# Patient Record
Sex: FEMALE | Race: WHITE | Employment: UNEMPLOYED | URBAN - METROPOLITAN AREA
[De-identification: names, ages, dates, MRNs, and addresses within clinical notes are randomized per-mention and may not be internally consistent; named-entity substitution may affect disease eponyms.]

---

## 2018-05-17 ENCOUNTER — HOSPITAL ENCOUNTER (OUTPATIENT)
Dept: MAMMOGRAPHY | Age: 78
Discharge: HOME | End: 2018-05-17
Attending: INTERNAL MEDICINE
Payer: MEDICARE

## 2018-05-17 DIAGNOSIS — Z78.0 POST-MENOPAUSAL: ICD-10-CM

## 2018-05-17 DIAGNOSIS — Z12.31 VISIT FOR SCREENING MAMMOGRAM: ICD-10-CM

## 2018-05-17 PROCEDURE — 77067 SCR MAMMO BI INCL CAD: CPT

## 2018-05-17 PROCEDURE — 77080 DXA BONE DENSITY AXIAL: CPT

## 2018-08-24 ENCOUNTER — APPOINTMENT (OUTPATIENT)
Dept: CT IMAGING | Age: 78
DRG: 305 | End: 2018-08-24
Attending: EMERGENCY MEDICINE
Payer: MEDICARE

## 2018-08-24 ENCOUNTER — HOSPITAL ENCOUNTER (INPATIENT)
Age: 78
LOS: 1 days | Discharge: HOME HEALTH | DRG: 305 | End: 2018-08-27
Attending: EMERGENCY MEDICINE | Admitting: INTERNAL MEDICINE
Payer: MEDICARE

## 2018-08-24 DIAGNOSIS — G45.3 AMAUROSIS FUGAX OF LEFT EYE: Primary | ICD-10-CM

## 2018-08-24 DIAGNOSIS — R29.6 FALLING: ICD-10-CM

## 2018-08-24 DIAGNOSIS — I10 HYPERTENSION, UNSPECIFIED TYPE: ICD-10-CM

## 2018-08-24 DIAGNOSIS — S00.83XA CONTUSION OF FACE, INITIAL ENCOUNTER: ICD-10-CM

## 2018-08-24 LAB
ALBUMIN SERPL-MCNC: 3.9 G/DL (ref 3.2–4.6)
ALBUMIN/GLOB SERPL: 0.8 {RATIO} (ref 1.2–3.5)
ALP SERPL-CCNC: 127 U/L (ref 50–136)
ALT SERPL-CCNC: 21 U/L (ref 12–65)
ANION GAP SERPL CALC-SCNC: 12 MMOL/L (ref 7–16)
AST SERPL-CCNC: 15 U/L (ref 15–37)
BASOPHILS # BLD: 0.1 K/UL (ref 0–0.2)
BASOPHILS NFR BLD: 1 % (ref 0–2)
BILIRUB DIRECT SERPL-MCNC: <0.1 MG/DL
BILIRUB SERPL-MCNC: 0.3 MG/DL (ref 0.2–1.1)
BUN SERPL-MCNC: 17 MG/DL (ref 8–23)
CALCIUM SERPL-MCNC: 10.6 MG/DL (ref 8.3–10.4)
CHLORIDE SERPL-SCNC: 104 MMOL/L (ref 98–107)
CO2 SERPL-SCNC: 28 MMOL/L (ref 21–32)
CREAT SERPL-MCNC: 0.74 MG/DL (ref 0.6–1)
DIFFERENTIAL METHOD BLD: NORMAL
EOSINOPHIL # BLD: 0.1 K/UL (ref 0–0.8)
EOSINOPHIL NFR BLD: 2 % (ref 0.5–7.8)
ERYTHROCYTE [DISTWIDTH] IN BLOOD BY AUTOMATED COUNT: 12.8 %
GLOBULIN SER CALC-MCNC: 4.7 G/DL (ref 2.3–3.5)
GLUCOSE BLD STRIP.AUTO-MCNC: 182 MG/DL (ref 65–100)
GLUCOSE SERPL-MCNC: 136 MG/DL (ref 65–100)
HCT VFR BLD AUTO: 38.1 % (ref 35.8–46.3)
HGB BLD-MCNC: 12.5 G/DL (ref 11.7–15.4)
IMM GRANULOCYTES # BLD: 0 K/UL (ref 0–0.5)
IMM GRANULOCYTES NFR BLD AUTO: 0 % (ref 0–5)
LIPASE SERPL-CCNC: 142 U/L (ref 73–393)
LYMPHOCYTES # BLD: 2.1 K/UL (ref 0.5–4.6)
LYMPHOCYTES NFR BLD: 31 % (ref 13–44)
MCH RBC QN AUTO: 28.3 PG (ref 26.1–32.9)
MCHC RBC AUTO-ENTMCNC: 32.8 G/DL (ref 31.4–35)
MCV RBC AUTO: 86.4 FL (ref 79.6–97.8)
MONOCYTES # BLD: 0.5 K/UL (ref 0.1–1.3)
MONOCYTES NFR BLD: 8 % (ref 4–12)
NEUTS SEG # BLD: 3.9 K/UL (ref 1.7–8.2)
NEUTS SEG NFR BLD: 59 % (ref 43–78)
NRBC # BLD: 0 K/UL (ref 0–0.2)
PLATELET # BLD AUTO: 190 K/UL (ref 150–450)
PMV BLD AUTO: 11 FL (ref 9.4–12.3)
POTASSIUM SERPL-SCNC: 3.5 MMOL/L (ref 3.5–5.1)
PROT SERPL-MCNC: 8.6 G/DL (ref 6.3–8.2)
RBC # BLD AUTO: 4.41 M/UL (ref 4.05–5.2)
SODIUM SERPL-SCNC: 144 MMOL/L (ref 136–145)
WBC # BLD AUTO: 6.7 K/UL (ref 4.3–11.1)

## 2018-08-24 PROCEDURE — 80048 BASIC METABOLIC PNL TOTAL CA: CPT

## 2018-08-24 PROCEDURE — 99218 HC RM OBSERVATION: CPT

## 2018-08-24 PROCEDURE — 96374 THER/PROPH/DIAG INJ IV PUSH: CPT | Performed by: EMERGENCY MEDICINE

## 2018-08-24 PROCEDURE — 72125 CT NECK SPINE W/O DYE: CPT

## 2018-08-24 PROCEDURE — 99284 EMERGENCY DEPT VISIT MOD MDM: CPT | Performed by: EMERGENCY MEDICINE

## 2018-08-24 PROCEDURE — 85025 COMPLETE CBC W/AUTO DIFF WBC: CPT

## 2018-08-24 PROCEDURE — 82962 GLUCOSE BLOOD TEST: CPT

## 2018-08-24 PROCEDURE — 83690 ASSAY OF LIPASE: CPT

## 2018-08-24 PROCEDURE — 96375 TX/PRO/DX INJ NEW DRUG ADDON: CPT | Performed by: EMERGENCY MEDICINE

## 2018-08-24 PROCEDURE — 74011250637 HC RX REV CODE- 250/637: Performed by: FAMILY MEDICINE

## 2018-08-24 PROCEDURE — 74011000250 HC RX REV CODE- 250: Performed by: EMERGENCY MEDICINE

## 2018-08-24 PROCEDURE — 70450 CT HEAD/BRAIN W/O DYE: CPT

## 2018-08-24 PROCEDURE — 74011636637 HC RX REV CODE- 636/637: Performed by: FAMILY MEDICINE

## 2018-08-24 PROCEDURE — 77030020263 HC SOL INJ SOD CL0.9% LFCR 1000ML

## 2018-08-24 PROCEDURE — 93005 ELECTROCARDIOGRAM TRACING: CPT | Performed by: EMERGENCY MEDICINE

## 2018-08-24 PROCEDURE — 74011250636 HC RX REV CODE- 250/636: Performed by: FAMILY MEDICINE

## 2018-08-24 PROCEDURE — 80076 HEPATIC FUNCTION PANEL: CPT

## 2018-08-24 RX ORDER — MONTELUKAST SODIUM 10 MG/1
10 TABLET ORAL DAILY
Status: DISCONTINUED | OUTPATIENT
Start: 2018-08-25 | End: 2018-08-27 | Stop reason: HOSPADM

## 2018-08-24 RX ORDER — SODIUM CHLORIDE 0.9 % (FLUSH) 0.9 %
5-10 SYRINGE (ML) INJECTION EVERY 8 HOURS
Status: DISCONTINUED | OUTPATIENT
Start: 2018-08-24 | End: 2018-08-27 | Stop reason: HOSPADM

## 2018-08-24 RX ORDER — DEXTROSE 40 %
15 GEL (GRAM) ORAL AS NEEDED
Status: DISCONTINUED | OUTPATIENT
Start: 2018-08-24 | End: 2018-08-27 | Stop reason: HOSPADM

## 2018-08-24 RX ORDER — BISACODYL 5 MG
5 TABLET, DELAYED RELEASE (ENTERIC COATED) ORAL DAILY PRN
Status: DISCONTINUED | OUTPATIENT
Start: 2018-08-24 | End: 2018-08-27 | Stop reason: HOSPADM

## 2018-08-24 RX ORDER — MIRTAZAPINE 15 MG/1
15 TABLET, FILM COATED ORAL
Status: DISCONTINUED | OUTPATIENT
Start: 2018-08-24 | End: 2018-08-26

## 2018-08-24 RX ORDER — ENOXAPARIN SODIUM 100 MG/ML
40 INJECTION SUBCUTANEOUS EVERY 24 HOURS
Status: DISCONTINUED | OUTPATIENT
Start: 2018-08-24 | End: 2018-08-27 | Stop reason: HOSPADM

## 2018-08-24 RX ORDER — BENAZEPRIL HYDROCHLORIDE 10 MG/1
40 TABLET ORAL DAILY
Status: DISCONTINUED | OUTPATIENT
Start: 2018-08-25 | End: 2018-08-27 | Stop reason: HOSPADM

## 2018-08-24 RX ORDER — ATENOLOL 25 MG/1
25 TABLET ORAL DAILY
Status: DISCONTINUED | OUTPATIENT
Start: 2018-08-25 | End: 2018-08-25

## 2018-08-24 RX ORDER — DEXTROSE 50 % IN WATER (D50W) INTRAVENOUS SYRINGE
25-50 AS NEEDED
Status: DISCONTINUED | OUTPATIENT
Start: 2018-08-24 | End: 2018-08-27 | Stop reason: HOSPADM

## 2018-08-24 RX ORDER — CLONIDINE HYDROCHLORIDE 0.1 MG/1
0.1 TABLET ORAL
Status: DISCONTINUED | OUTPATIENT
Start: 2018-08-24 | End: 2018-08-25

## 2018-08-24 RX ORDER — CHLORTHALIDONE 25 MG/1
25 TABLET ORAL DAILY
Status: DISCONTINUED | OUTPATIENT
Start: 2018-08-25 | End: 2018-08-25

## 2018-08-24 RX ORDER — FAMOTIDINE 20 MG/1
20 TABLET, FILM COATED ORAL EVERY 12 HOURS
Status: DISCONTINUED | OUTPATIENT
Start: 2018-08-24 | End: 2018-08-27 | Stop reason: HOSPADM

## 2018-08-24 RX ORDER — SODIUM CHLORIDE 9 MG/ML
100 INJECTION, SOLUTION INTRAVENOUS CONTINUOUS
Status: DISCONTINUED | OUTPATIENT
Start: 2018-08-24 | End: 2018-08-25

## 2018-08-24 RX ORDER — LABETALOL HYDROCHLORIDE 5 MG/ML
20 INJECTION, SOLUTION INTRAVENOUS
Status: COMPLETED | OUTPATIENT
Start: 2018-08-24 | End: 2018-08-24

## 2018-08-24 RX ORDER — FERROUS SULFATE, DRIED 160(50) MG
1 TABLET, EXTENDED RELEASE ORAL
Status: DISCONTINUED | OUTPATIENT
Start: 2018-08-25 | End: 2018-08-27 | Stop reason: HOSPADM

## 2018-08-24 RX ORDER — LORATADINE 10 MG/1
10 TABLET ORAL DAILY
Status: DISCONTINUED | OUTPATIENT
Start: 2018-08-25 | End: 2018-08-27 | Stop reason: HOSPADM

## 2018-08-24 RX ORDER — HYDRALAZINE HYDROCHLORIDE 50 MG/1
50 TABLET, FILM COATED ORAL
Status: DISCONTINUED | OUTPATIENT
Start: 2018-08-24 | End: 2018-08-25

## 2018-08-24 RX ORDER — ACETAMINOPHEN 325 MG/1
650 TABLET ORAL
Status: DISCONTINUED | OUTPATIENT
Start: 2018-08-24 | End: 2018-08-27 | Stop reason: HOSPADM

## 2018-08-24 RX ORDER — PRAVASTATIN SODIUM 20 MG/1
40 TABLET ORAL
Status: DISCONTINUED | OUTPATIENT
Start: 2018-08-24 | End: 2018-08-27 | Stop reason: HOSPADM

## 2018-08-24 RX ORDER — ONDANSETRON 2 MG/ML
4 INJECTION INTRAMUSCULAR; INTRAVENOUS
Status: DISCONTINUED | OUTPATIENT
Start: 2018-08-24 | End: 2018-08-27 | Stop reason: HOSPADM

## 2018-08-24 RX ORDER — SPIRONOLACTONE 25 MG/1
25 TABLET ORAL DAILY
Status: DISCONTINUED | OUTPATIENT
Start: 2018-08-25 | End: 2018-08-25

## 2018-08-24 RX ORDER — ASPIRIN 81 MG/1
81 TABLET ORAL DAILY
Status: DISCONTINUED | OUTPATIENT
Start: 2018-08-25 | End: 2018-08-27 | Stop reason: HOSPADM

## 2018-08-24 RX ORDER — LORATADINE 10 MG/1
10 TABLET ORAL DAILY
Status: DISCONTINUED | OUTPATIENT
Start: 2018-08-25 | End: 2018-08-25 | Stop reason: SDUPTHER

## 2018-08-24 RX ORDER — LORAZEPAM 1 MG/1
1 TABLET ORAL 2 TIMES DAILY
Status: DISCONTINUED | OUTPATIENT
Start: 2018-08-24 | End: 2018-08-25

## 2018-08-24 RX ORDER — SENNOSIDES 8.6 MG/1
1 TABLET ORAL DAILY
Status: DISCONTINUED | OUTPATIENT
Start: 2018-08-25 | End: 2018-08-27 | Stop reason: HOSPADM

## 2018-08-24 RX ORDER — CALCITONIN SALMON 200 [IU]/.09ML
1 SPRAY, METERED NASAL DAILY
Status: DISCONTINUED | OUTPATIENT
Start: 2018-08-25 | End: 2018-08-24

## 2018-08-24 RX ORDER — PANTOPRAZOLE SODIUM 40 MG/1
40 TABLET, DELAYED RELEASE ORAL
Status: DISCONTINUED | OUTPATIENT
Start: 2018-08-25 | End: 2018-08-27 | Stop reason: HOSPADM

## 2018-08-24 RX ORDER — HYDRALAZINE HYDROCHLORIDE 20 MG/ML
20 INJECTION INTRAMUSCULAR; INTRAVENOUS ONCE
Status: COMPLETED | OUTPATIENT
Start: 2018-08-24 | End: 2018-08-24

## 2018-08-24 RX ORDER — SODIUM CHLORIDE 0.9 % (FLUSH) 0.9 %
5-10 SYRINGE (ML) INJECTION AS NEEDED
Status: DISCONTINUED | OUTPATIENT
Start: 2018-08-24 | End: 2018-08-27 | Stop reason: HOSPADM

## 2018-08-24 RX ADMIN — FAMOTIDINE 20 MG: 20 TABLET ORAL at 22:06

## 2018-08-24 RX ADMIN — PRAVASTATIN SODIUM 40 MG: 20 TABLET ORAL at 22:06

## 2018-08-24 RX ADMIN — LORAZEPAM 1 MG: 1 TABLET ORAL at 22:06

## 2018-08-24 RX ADMIN — INSULIN HUMAN 2 UNITS: 100 INJECTION, SOLUTION PARENTERAL at 22:55

## 2018-08-24 RX ADMIN — SODIUM CHLORIDE 100 ML/HR: 900 INJECTION, SOLUTION INTRAVENOUS at 22:06

## 2018-08-24 RX ADMIN — LABETALOL HYDROCHLORIDE 20 MG: 5 INJECTION, SOLUTION INTRAVENOUS at 19:25

## 2018-08-24 RX ADMIN — HYDRALAZINE HYDROCHLORIDE 20 MG: 20 INJECTION INTRAMUSCULAR; INTRAVENOUS at 19:40

## 2018-08-24 RX ADMIN — ENOXAPARIN SODIUM 40 MG: 40 INJECTION, SOLUTION INTRAVENOUS; SUBCUTANEOUS at 22:06

## 2018-08-24 RX ADMIN — MIRTAZAPINE 15 MG: 15 TABLET, FILM COATED ORAL at 22:06

## 2018-08-24 NOTE — ED PROVIDER NOTES
HPI Comments: 59-year-old white female who lives at home has had increased falling for the past couple weeks. She had a fall 1 week ago and struck her head on a nightstand. No loss of consciousness. She was not seen at the time of the fall. She did have nausea and vomiting the day of the fall and the next day but that has resolved. She does report that she feels \"confused\". She states she has trouble remembering names. She is also complaining of headache intermittently. She also describes a pressure discomfort in the back of her head and upper part of her neck. Patient also states that over the past 2 or 3 weeks and she has lost vision in her left eye. She states this comes on abruptly and lasts a few minutes. She does not have any eye pain with these episodes. Patient is a 77 y.o. female presenting with fall. The history is provided by the patient. Fall   Associated symptoms include headaches. Pertinent negatives include no fever, no abdominal pain and no vomiting. Past Medical History:   Diagnosis Date    Arthritis     Atrial fibrillation (HCC)     Diabetes ([facility] [address]     GERD (gastroesophageal reflux disease)     HTN     Hyperlipidemia     Paroxysmal SVT [facility]ra[facility]ricular tachycardia) ([facility]) / Rapid atrial 10/12/2015    Seasonal allergic rhinitis        Past Surgical History:   Procedure Laterality Date    HX CORONARY STENT PLACEMENT      HX HEENT      HX OTHER SURGICAL      AVNRT ABLATION         Family History:   Problem Relation Age of Onset    Heart Attack Mother     Cancer Father      leukemia    Cancer Brother     Cancer Sister     Breast Cancer Sister        Social History     Social History    Marital status:      Spouse name: N/A    Number of children: N/A    Years of education: N/A     Occupational History    Not on file.      Social History Main Topics    Smoking status: Never Smoker    Smokeless tobacco: Never Used    Alcohol use No    Drug use: No    Sexual activity: Not on file     Other Topics Concern    Not on file     Social History Narrative         ALLERGIES: Biaxin [clarithromycin]; Macrobid [nitrofurantoin monohyd/m-cryst]; Macrodantin [nitrofurantoin macrocrystalline]; Norvasc [amlodipine]; and Ofloxacin    Review of Systems   Constitutional: Negative for fever. HENT: Negative for congestion. Respiratory: Negative for cough and shortness of breath. Cardiovascular: Negative for chest pain. Gastrointestinal: Negative for abdominal pain and vomiting. Genitourinary: Negative for dysuria. Musculoskeletal: Positive for neck pain. Negative for back pain. Skin: Negative for rash. Neurological: Positive for headaches. Vitals:    08/24/18 1621   BP: (!) 243/104   Pulse: 77   Resp: 16   Temp: 98.4 °F (36.9 °C)   SpO2: 98%   Weight: 52.2 kg (115 lb)   Height: 5' 5\" (1.651 m)            Physical Exam   Constitutional: She is oriented to person, place, and time. She appears well-developed and well-nourished. No distress. HENT:   Head: Normocephalic. Old contusion left forehead with mild periorbital ecchymosis. Eyes: EOM are normal. Pupils are equal, round, and reactive to light. Neck: Normal range of motion. Neck supple. No midline tenderness   Cardiovascular: Normal rate and regular rhythm. No murmur heard. Pulmonary/Chest: Effort normal. She has no wheezes. Abdominal: Soft. She exhibits no distension and no mass. There is tenderness in the epigastric area. There is no guarding. Musculoskeletal: Normal range of motion. She exhibits no edema. Neurological: She is alert and oriented to person, place, and time. Skin: Skin is warm and dry. Psychiatric: She has a normal mood and affect. Vitals reviewed. MDM  Number of Diagnoses or Management Options  Diagnosis management comments: EKG normal sinus rhythm without diagnostic changes. Lab work unremarkable. Head CT and C-spine CT no acute abnormality.   Patient treated with labetalol for hypertension. Her presentation is concerning for amaurosis fugax. Will discuss with hospitalist for admission.        Amount and/or Complexity of Data Reviewed  Clinical lab tests: ordered and reviewed  Tests in the radiology section of CPT®: ordered and reviewed  Tests in the medicine section of CPT®: ordered and reviewed  Discuss the patient with other providers: yes  Independent visualization of images, tracings, or specimens: yes    Risk of Complications, Morbidity, and/or Mortality  Presenting problems: moderate  Diagnostic procedures: moderate  Management options: moderate          ED Course       Procedures

## 2018-08-24 NOTE — IP AVS SNAPSHOT
[address] [facility] 
 
 
 [address] [facility] [address] [facility] 
[phone] Patient: Cheyenne Nolasco MRN: [id number]:[id number] About your hospitalization You were admitted on:  August 24, 2018 You last received care in the:  [facility] 2 SURGICAL You were discharged on:  August 27, 2018 Why you were hospitalized Your primary diagnosis was:  Htn (Hypertension), Malignant Your diagnoses also included:  Amaurosis Fugax, Diabetes (Hcc), Hypokalemia, Hypomagnesemia, Hyperlipidemia, Anxiety Disorder, Fall At [facility] Follow-up Information Follow up With Details Comments Contact Info Saritha Moreno DO On 9/10/2018 please arrive at 1:50 for an appt time at 2:15 [address] [place] [place] 
[phone] Your Scheduled Appointments Monday September 10, 2018  2:15 PM EDT New Patient with Saritha Moreno  [facility] [address] [place]  
[phone] Discharge Orders None A check archie indicates which time of day the medication should be taken. My Medications START taking these medications Instructions Each Dose to Equal  
 Morning Noon Evening Bedtime  
 hydrALAZINE 50 mg tablet Commonly known as:  APRESOLINE Take 1 Tab by mouth every eight (8) hours. 8 AM, 2 PM, 8 PM  Indications: hypertension 50 mg CHANGE how you take these medications Instructions Each Dose to Equal  
 Morning Noon Evening Bedtime  
 atenolol 25 mg tablet Commonly known as:  TENORMIN What changed:  Another medication with the same name was removed. Continue taking this medication, and follow the directions you see here. Take 1 Tab by mouth two (2) times a day. Indications: hypertension 25 mg  
    
   
   
  
   
  
 benazepril 40 mg tablet Commonly known as:  LOTENSIN  
 What changed:  Another medication with the same name was removed. Continue taking this medication, and follow the directions you see here. Take 1 Tab by mouth daily. Indications: hypertension 40 mg  
    
  
   
   
   
  
 metFORMIN 1,000 mg tablet Commonly known as:  GLUCOPHAGE What changed:  Another medication with the same name was removed. Continue taking this medication, and follow the directions you see here. Your next dose is: Take as directed Take 1,000 mg by mouth two (2) times daily (with meals). Indications: type 2 diabetes mellitus 1000 mg  
    
   
   
   
  
 omeprazole 20 mg capsule Commonly known as:  PriLOSEC What changed:   
- how much to take - when to take this 
- additional instructions Your next dose is: Take as directed Take 2 Caps by mouth daily. 40 mg CONTINUE taking these medications Instructions Each Dose to Equal  
 Morning Noon Evening Bedtime  
 aspirin delayed-release 81 mg tablet Take  by mouth daily. garlic 1 mg Cap Your next dose is: Take as directed Take 1 Cap by mouth daily. 1 Cap  
    
   
   
   
  
 hydroCHLOROthiazide 25 mg tablet Commonly known as:  HYDRODIURIL Take 1 Tab by mouth daily. Indications: hypertension 25 mg  
    
  
   
   
   
  
 multivitamin tablet Commonly known as:  ONE A DAY Your next dose is: Take as directed Take 1 Tab by mouth daily. 1 Tab  
    
   
   
   
  
 pravastatin 40 mg tablet Commonly known as:  PRAVACHOL Take 1 Tab by mouth nightly. 40 mg PROBIOTIC 4X 10-15 mg Tbec Generic drug:  B.infantis-B.ani-B.long-B.bifi Your next dose is: Take as directed Take  by mouth. SALMON OIL-1000 PO Your next dose is: Take as directed Take  by mouth. STOP taking these medications ATIVAN 1 mg tablet Generic drug:  LORazepam  
   
  
 LORazepam 1 mg tablet Commonly known as:  ATIVAN Where to Get Your Medications Information on where to get these meds will be given to you by the nurse or doctor. ! Ask your nurse or doctor about these medications  
  atenolol 25 mg tablet  
 benazepril 40 mg tablet  
 hydrALAZINE 50 mg tablet  
 hydroCHLOROthiazide 25 mg tablet  
 pravastatin 40 mg tablet Discharge Instructions DISCHARGE SUMMARY from Nurse PATIENT INSTRUCTIONS: 
 
 
F-face looks uneven A-arms unable to move or move unevenly S-speech slurred or non-existent T-time-call 911 as soon as signs and symptoms begin-DO NOT go Back to bed or wait to see if you get better-TIME IS BRAIN. Warning Signs of HEART ATTACK Call 911 if you have these symptoms: 
? Chest discomfort. Most heart attacks involve discomfort in the center of the chest that lasts more than a few minutes, or that goes away and comes back. It can feel like uncomfortable pressure, squeezing, fullness, or pain. ? Discomfort in other areas of the upper body. Symptoms can include pain or discomfort in one or both arms, the back, neck, jaw, or stomach. ? Shortness of breath with or without chest discomfort. ? Other signs may include breaking out in a cold sweat, nausea, or lightheadedness. Don't wait more than five minutes to call [address]! Fast action can save your life. Calling 911 is almost always the fastest way to get lifesaving treatment.  Emergency Medical Services staff can begin treatment when they arrive  up to an hour sooner than if someone gets to the hospital by car. The discharge information has been reviewed with the patient. The patient verbalized understanding. Discharge medications reviewed with the patient and appropriate educational materials and side effects teaching were provided. ___________________________________________________________________________________________________________________________________ High Blood Pressure: Care Instructions Your Care Instructions If your blood pressure is usually above 130/80, you have high blood pressure, or hypertension. That means the top number is 130 or higher or the bottom number is 80 or higher, or both. Despite what a lot of people think, high blood pressure usually doesn't cause headaches or make you feel dizzy or lightheaded. It usually has no symptoms. But it does increase your risk for heart attack, stroke, and kidney or eye damage. The higher your blood pressure, the more your risk increases. Your doctor will give you a goal for your blood pressure. Your goal will be based on your health and your age. Lifestyle changes, such as eating healthy and being active, are always important to help lower blood pressure. You might also take medicine to reach your blood pressure goal. 
Follow-up care is a key part of your treatment and safety. Be sure to make and go to all appointments, and call your doctor if you are having problems. It's also a good idea to know your test results and keep a list of the medicines you take. How can you care for yourself at home? Medical treatment · If you stop taking your medicine, your blood pressure will go back up. You may take one or more types of medicine to lower your blood pressure. Be safe with medicines. Take your medicine exactly as prescribed. Call your doctor if you think you are having a problem with your medicine. · Talk to your doctor before you start taking aspirin every day. Aspirin can help certain people lower their risk of a heart attack or stroke. But taking aspirin isn't right for everyone, because it can cause serious bleeding. · See your doctor regularly. You may need to see the doctor more often at first or until your blood pressure comes down. · If you are taking blood pressure medicine, talk to your doctor before you take decongestants or anti-inflammatory medicine, such as ibuprofen. Some of these medicines can raise blood pressure. · Learn how to check your blood pressure at home. Lifestyle changes · Stay at a healthy weight. This is especially important if you put on weight around the waist. Losing even 10 pounds can help you lower your blood pressure. · If your doctor recommends it, get more exercise. Walking is a good choice. Bit by bit, increase the amount you walk every day. Try for at least 30 minutes on most days of the week. You also may want to swim, bike, or do other activities. · Avoid or limit alcohol. Talk to your doctor about whether you can drink any alcohol. · Try to limit how much sodium you eat to less than 2,300 milligrams (mg) a day. Your doctor may ask you to try to eat less than 1,500 mg a day. · Eat plenty of fruits (such as bananas and oranges), vegetables, legumes, whole grains, and low-fat dairy products. · Lower the amount of saturated fat in your diet. Saturated fat is found in animal products such as milk, cheese, and meat. Limiting these foods may help you lose weight and also lower your risk for heart disease. · Do not smoke. Smoking increases your risk for heart attack and stroke. If you need help quitting, talk to your doctor about stop-smoking programs and medicines. These can increase your chances of quitting for good. When should you call for help? Call 911 anytime you think you may need emergency care.  This may mean having symptoms that suggest that your blood pressure is causing a serious heart or blood vessel problem. Your blood pressure may be over 180/110. 
 For example, call 911 if: 
  · You have symptoms of a heart attack. These may include: ¨ Chest pain or pressure, or a strange feeling in the chest. 
¨ Sweating. ¨ Shortness of breath. ¨ Nausea or vomiting. ¨ Pain, pressure, or a strange feeling in the back, neck, jaw, or upper belly or in one or both shoulders or arms. ¨ Lightheadedness or sudden weakness. ¨ A fast or irregular heartbeat.  
  · You have symptoms of a stroke. These may include: 
¨ Sudden numbness, tingling, weakness, or loss of movement in your face, arm, or leg, especially on only one side of your body. ¨ Sudden vision changes. ¨ Sudden trouble speaking. ¨ Sudden confusion or trouble understanding simple statements. ¨ Sudden problems with walking or balance. ¨ A sudden, severe headache that is different from past headaches.  
  · You have severe back or belly pain.  
 Do not wait until your blood pressure comes down on its own. Get help right away. 
 Call your doctor now or seek immediate care if: 
  · Your blood pressure is much higher than normal (such as 180/110 or higher), but you don't have symptoms.  
  · You think high blood pressure is causing symptoms, such as: ¨ Severe headache. ¨ Blurry vision.  
 Watch closely for changes in your health, and be sure to contact your doctor if: 
  · Your blood pressure measures 140/90 or higher at least 2 times. That means the top number is 140 or higher or the bottom number is 90 or higher, or both.  
  · You think you may be having side effects from your blood pressure medicine.  
  · Your blood pressure is usually normal, but it goes above normal at least 2 times. Where can you learn more? Go to http://sophie-luis daniel.info/. Enter B107 in the search box to learn more about \"High Blood Pressure: Care Instructions. \" 
 Current as of: December 6, 2017 Content Version: 11.7 © 1584-8907 [employer]. Care instructions adapted under license by [employer] (which disclaims liability or warranty for this information). If you have questions about a medical condition or this instruction, always ask your healthcare professional. [address] any warranty or liability for your use of this information. [address] Announcement We are excited to announce that we are making your provider's discharge notes available to you in F[address]rier 46. You will see these notes when they are completed and signed by the physician that discharged you from your recent hospital stay. If you have any questions or concerns about any information you see in F[address], please call the Health Information Department where you were seen or reach out to your Primary Care Provider for more information about your plan of care. Introducing [facility]! Farzana Mosqueda introduces F[address] patient portal. Now you can access parts of your medical record, email your doctor's office, and request medication refills online. 1. In your internet browser, go to [email or web address] 2. Click on the First Time User? Click Here link in the Sign In box. You will see the New Member Sign Up page. 3. Enter your F[address] Access Code exactly as it appears below. You will not need to use this code after youve completed the sign-up process. If you do not sign up before the expiration date, you must request a new code. · F[address] Access Code: [email or web address] Expires: 11/22/2018  4:13 PM 
 
4. Enter the last four digits of your Social Security Number (xxxx) and Date of Birth (mm/dd/yyyy) as indicated and click Submit. You will be taken to the next sign-up page. 5. Create a F[address]rier [address] ID. This will be your F[address] login ID and cannot be changed, so think of one that is secure and easy to remember. 6. Create a Kiwigrid password. You can change your password at any time. 7. Enter your Password Reset Question and Answer. This can be used at a later time if you forget your password. 8. Enter your e-mail address. You will receive e-mail notification when new information is available in [address] 9. Click Sign Up. You can now view and download portions of your medical record. 10. Click the Download Summary menu link to download a portable copy of your medical information. If you have questions, please visit the Frequently Asked Questions section of the Kiwigrid website. Remember, Kiwigrid is NOT to be used for urgent needs. For medical emergencies, dial 911. Now available from your iPhone and Android! Introducing Mg Rodriguez As a [facility] patient, I wanted to make you aware of our electronic visit tool called Mg Rodriguez. [employer] 24/7 allows you to connect within minutes with a medical provider 24 hours a day, seven days a week via a mobile device or tablet or logging into a secure website from your computer. You can access Mg Rodriguez from anywhere in the [place]. A virtual visit might be right for you when you have a simple condition and feel like you just dont want to get out of bed, or cant get away from work for an appointment, when your regular [facility] provider is not available (evenings, weekends or holidays), or when youre out of town and need minor care. Electronic visits cost only $49 and if the [facility] [place] 24/7 provider determines a prescription is needed to treat your condition, one can be electronically transmitted to a nearby pharmacy*. Please take a moment to enroll today if you have not already done so. The enrollment process is free and takes just a few minutes. To enroll, please download the [employer] 24/WeeWorld robert to your tablet or phone, or visit [email or web address] to enroll on your computer. And, as an [address] [place] [address] patient with a [employer] account, the results of your visits will be scanned into your electronic medical record and your primary care provider will be able to view the scanned results. We urge you to continue to see your regular [employer] provider for your ongoing medical care. And while your primary care provider may not be the one available when you seek a Mg Emmanuelfin virtual visit, the peace of mind you get from getting a real diagnosis real time can be priceless. For more information on Mg Vapothermjohnfin, view our Frequently Asked Questions (FAQs) at [email or web address]. Sincerely, 
 
Tanesha Andrews MD 
Chief Medical Officer [employer] *:  certain medications cannot be prescribed via Member Deskjohnfin Providers Seen During Your Hospitalization Provider Specialty Primary office phone Brandyn Bauer MD Emergency Medicine [phone] Karen Hidalgo MD Family Practice [phone] Immunizations Administered for This Admission Name Date  
 TB Skin Test (PPD) Intradermal 8/26/2018 Your Primary Care Physician (PCP) Primary Care Physician Office Phone Office Fax Nasim Pimentel [phone] You are allergic to the following Allergen Reactions Biaxin (Clarithromycin) Rash Hives Macrobid (Nitrofurantoin Monohyd/M-Cryst) Rash Macrodantin (Nitrofurantoin Macrocrystalline) Rash Norvasc (Amlodipine) Other (comments) Rash Headache, blurred vision Ofloxacin Other (comments) Rash Blurred vision, headache Recent Documentation Height Weight BMI OB Status Smoking Status 1.651 m 52.2 kg 19.14 kg/m2 Postmenopausal Never Smoker Emergency Contacts Name Discharge Info Relation Home Work Mobile Elisabet Hennessy  Daughter [21] [phone] Patient Belongings The following personal items are in your possession at time of discharge: 
                             
 
  
  
 Please provide this summary of care documentation to your next provider. Signatures-by signing, you are acknowledging that this After Visit Summary has been reviewed with you and you have received a copy. Patient Signature:  ____________________________________________________________ Date:  ____________________________________________________________  
  
[facility]  Provider Signature:  ____________________________________________________________ Date:  ____________________________________________________________

## 2018-08-24 NOTE — ED NOTES
TRANSFER - OUT REPORT:    Verbal report given to Todd(name) on Ken Arellano  being transferred to 205(unit) for routine progression of care       Report consisted of patients Situation, Background, Assessment and   Recommendations(SBAR). Information from the following report(s) ED Summary was reviewed with the receiving nurse. Lines:   Peripheral IV 08/24/18 Right Antecubital (Active)   Site Assessment Clean, dry, & intact 8/24/2018  7:54 PM   Phlebitis Assessment 0 8/24/2018  7:54 PM   Infiltration Assessment 0 8/24/2018  7:54 PM   Dressing Status Clean, dry, & intact 8/24/2018  7:54 PM        Opportunity for questions and clarification was provided.       Patient transported with:   [employer]

## 2018-08-24 NOTE — ED TRIAGE NOTES
Pt states she fell and hit her head last Saturday. Pt takes a daily asa. Pt states she got her feet tangled and fell. Denies any loc. Pt states having vomiting when it happened but none since. Pt states having a headache and neck pain. Family states the pt has been complaining of left sided abdominal pain.

## 2018-08-25 ENCOUNTER — APPOINTMENT (OUTPATIENT)
Dept: MRI IMAGING | Age: 78
DRG: 305 | End: 2018-08-25
Attending: FAMILY MEDICINE
Payer: MEDICARE

## 2018-08-25 PROBLEM — E87.6 HYPOKALEMIA: Status: ACTIVE | Noted: 2018-08-25

## 2018-08-25 PROBLEM — E83.42 HYPOMAGNESEMIA: Status: ACTIVE | Noted: 2018-08-25

## 2018-08-25 LAB
ANION GAP SERPL CALC-SCNC: 8 MMOL/L (ref 7–16)
ATRIAL RATE: 69 BPM
BUN SERPL-MCNC: 14 MG/DL (ref 8–23)
CALCIUM SERPL-MCNC: 9.6 MG/DL (ref 8.3–10.4)
CALCULATED P AXIS, ECG09: 55 DEGREES
CALCULATED R AXIS, ECG10: -2 DEGREES
CALCULATED T AXIS, ECG11: 25 DEGREES
CHLORIDE SERPL-SCNC: 106 MMOL/L (ref 98–107)
CHOLEST SERPL-MCNC: 112 MG/DL
CO2 SERPL-SCNC: 28 MMOL/L (ref 21–32)
CREAT SERPL-MCNC: 0.58 MG/DL (ref 0.6–1)
CRP SERPL-MCNC: <0.3 MG/DL (ref 0–0.9)
DIAGNOSIS, 93000: NORMAL
ERYTHROCYTE [DISTWIDTH] IN BLOOD BY AUTOMATED COUNT: 12.7 %
ERYTHROCYTE [SEDIMENTATION RATE] IN BLOOD: 22 MM/HR (ref 0–30)
EST. AVERAGE GLUCOSE BLD GHB EST-MCNC: 134 MG/DL
GLUCOSE BLD STRIP.AUTO-MCNC: 120 MG/DL (ref 65–100)
GLUCOSE BLD STRIP.AUTO-MCNC: 120 MG/DL (ref 65–100)
GLUCOSE BLD STRIP.AUTO-MCNC: 127 MG/DL (ref 65–100)
GLUCOSE BLD STRIP.AUTO-MCNC: 145 MG/DL (ref 65–100)
GLUCOSE SERPL-MCNC: 106 MG/DL (ref 65–100)
HBA1C MFR BLD: 6.3 % (ref 4.8–6)
HCT VFR BLD AUTO: 32.5 % (ref 35.8–46.3)
HDLC SERPL-MCNC: 43 MG/DL (ref 40–60)
HDLC SERPL: 2.6 {RATIO}
HGB BLD-MCNC: 10.7 G/DL (ref 11.7–15.4)
LDLC SERPL CALC-MCNC: 41.4 MG/DL
LIPID PROFILE,FLP: ABNORMAL
MAGNESIUM SERPL-MCNC: 1.5 MG/DL (ref 1.8–2.4)
MCH RBC QN AUTO: 28.3 PG (ref 26.1–32.9)
MCHC RBC AUTO-ENTMCNC: 32.9 G/DL (ref 31.4–35)
MCV RBC AUTO: 86 FL (ref 79.6–97.8)
NRBC # BLD: 0 K/UL (ref 0–0.2)
P-R INTERVAL, ECG05: 166 MS
PLATELET # BLD AUTO: 172 K/UL (ref 150–450)
PMV BLD AUTO: 11.3 FL (ref 9.4–12.3)
POTASSIUM SERPL-SCNC: 3 MMOL/L (ref 3.5–5.1)
Q-T INTERVAL, ECG07: 374 MS
QRS DURATION, ECG06: 94 MS
QTC CALCULATION (BEZET), ECG08: 400 MS
RBC # BLD AUTO: 3.78 M/UL (ref 4.05–5.2)
SODIUM SERPL-SCNC: 142 MMOL/L (ref 136–145)
T4 FREE SERPL-MCNC: 1.1 NG/DL (ref 0.78–1.46)
TRIGL SERPL-MCNC: 138 MG/DL (ref 35–150)
TSH SERPL DL<=0.005 MIU/L-ACNC: 3.57 UIU/ML (ref 0.36–3.74)
VENTRICULAR RATE, ECG03: 69 BPM
VLDLC SERPL CALC-MCNC: 27.6 MG/DL (ref 6–23)
WBC # BLD AUTO: 6 K/UL (ref 4.3–11.1)

## 2018-08-25 PROCEDURE — 77030020263 HC SOL INJ SOD CL0.9% LFCR 1000ML

## 2018-08-25 PROCEDURE — 84481 FREE ASSAY (FT-3): CPT

## 2018-08-25 PROCEDURE — 80048 BASIC METABOLIC PNL TOTAL CA: CPT

## 2018-08-25 PROCEDURE — 74011250637 HC RX REV CODE- 250/637: Performed by: INTERNAL MEDICINE

## 2018-08-25 PROCEDURE — 99218 HC RM OBSERVATION: CPT

## 2018-08-25 PROCEDURE — 82962 GLUCOSE BLOOD TEST: CPT

## 2018-08-25 PROCEDURE — 74011250636 HC RX REV CODE- 250/636: Performed by: FAMILY MEDICINE

## 2018-08-25 PROCEDURE — 80061 LIPID PANEL: CPT

## 2018-08-25 PROCEDURE — 85027 COMPLETE CBC AUTOMATED: CPT

## 2018-08-25 PROCEDURE — 83735 ASSAY OF MAGNESIUM: CPT

## 2018-08-25 PROCEDURE — 36415 COLL VENOUS BLD VENIPUNCTURE: CPT

## 2018-08-25 PROCEDURE — 70551 MRI BRAIN STEM W/O DYE: CPT

## 2018-08-25 PROCEDURE — 83036 HEMOGLOBIN GLYCOSYLATED A1C: CPT

## 2018-08-25 PROCEDURE — 86140 C-REACTIVE PROTEIN: CPT

## 2018-08-25 PROCEDURE — 84443 ASSAY THYROID STIM HORMONE: CPT

## 2018-08-25 PROCEDURE — 84439 ASSAY OF FREE THYROXINE: CPT

## 2018-08-25 PROCEDURE — 85652 RBC SED RATE AUTOMATED: CPT

## 2018-08-25 PROCEDURE — 74011250637 HC RX REV CODE- 250/637: Performed by: FAMILY MEDICINE

## 2018-08-25 PROCEDURE — 74011250636 HC RX REV CODE- 250/636: Performed by: INTERNAL MEDICINE

## 2018-08-25 RX ORDER — METFORMIN HYDROCHLORIDE 1000 MG/1
1000 TABLET ORAL 2 TIMES DAILY WITH MEALS
COMMUNITY
End: 2021-05-10

## 2018-08-25 RX ORDER — HYDROCHLOROTHIAZIDE 25 MG/1
25 TABLET ORAL DAILY
Status: ON HOLD | COMMUNITY
End: 2018-08-27

## 2018-08-25 RX ORDER — POTASSIUM CHLORIDE 20 MEQ/1
20 TABLET, EXTENDED RELEASE ORAL DAILY
Status: DISCONTINUED | OUTPATIENT
Start: 2018-08-25 | End: 2018-08-27 | Stop reason: HOSPADM

## 2018-08-25 RX ORDER — ATENOLOL 25 MG/1
25 TABLET ORAL 2 TIMES DAILY
Status: DISCONTINUED | OUTPATIENT
Start: 2018-08-25 | End: 2018-08-27 | Stop reason: HOSPADM

## 2018-08-25 RX ORDER — HYDRALAZINE HYDROCHLORIDE 25 MG/1
25 TABLET, FILM COATED ORAL EVERY 8 HOURS
Status: DISCONTINUED | OUTPATIENT
Start: 2018-08-25 | End: 2018-08-26

## 2018-08-25 RX ORDER — MAGNESIUM SULFATE HEPTAHYDRATE 40 MG/ML
2 INJECTION, SOLUTION INTRAVENOUS ONCE
Status: COMPLETED | OUTPATIENT
Start: 2018-08-25 | End: 2018-08-25

## 2018-08-25 RX ORDER — BENAZEPRIL HYDROCHLORIDE 40 MG/1
40 TABLET ORAL DAILY
Status: ON HOLD | COMMUNITY
End: 2018-08-27

## 2018-08-25 RX ORDER — HYDRALAZINE HYDROCHLORIDE 20 MG/ML
20 INJECTION INTRAMUSCULAR; INTRAVENOUS ONCE
Status: COMPLETED | OUTPATIENT
Start: 2018-08-25 | End: 2018-08-25

## 2018-08-25 RX ORDER — HYDRALAZINE HYDROCHLORIDE 25 MG/1
25 TABLET, FILM COATED ORAL 3 TIMES DAILY
Status: DISCONTINUED | OUTPATIENT
Start: 2018-08-25 | End: 2018-08-25

## 2018-08-25 RX ORDER — LORAZEPAM 0.5 MG/1
0.5 TABLET ORAL
Status: DISCONTINUED | OUTPATIENT
Start: 2018-08-25 | End: 2018-08-26

## 2018-08-25 RX ORDER — HYDRALAZINE HYDROCHLORIDE 20 MG/ML
10 INJECTION INTRAMUSCULAR; INTRAVENOUS
Status: DISCONTINUED | OUTPATIENT
Start: 2018-08-25 | End: 2018-08-27 | Stop reason: HOSPADM

## 2018-08-25 RX ORDER — HYDROCHLOROTHIAZIDE 25 MG/1
25 TABLET ORAL DAILY
Status: DISCONTINUED | OUTPATIENT
Start: 2018-08-26 | End: 2018-08-27 | Stop reason: HOSPADM

## 2018-08-25 RX ORDER — ATENOLOL 25 MG/1
25 TABLET ORAL 2 TIMES DAILY
Status: ON HOLD | COMMUNITY
End: 2018-08-27

## 2018-08-25 RX ADMIN — BENAZEPRIL HYDROCHLORIDE 40 MG: 10 TABLET, FILM COATED ORAL at 08:44

## 2018-08-25 RX ADMIN — MAGNESIUM SULFATE HEPTAHYDRATE 2 G: 40 INJECTION, SOLUTION INTRAVENOUS at 12:26

## 2018-08-25 RX ADMIN — SENNOSIDES 8.6 MG: 8.6 TABLET, FILM COATED ORAL at 08:45

## 2018-08-25 RX ADMIN — SPIRONOLACTONE 25 MG: 25 TABLET ORAL at 08:46

## 2018-08-25 RX ADMIN — HYDRALAZINE HYDROCHLORIDE 25 MG: 25 TABLET, FILM COATED ORAL at 20:06

## 2018-08-25 RX ADMIN — Medication 10 ML: at 15:05

## 2018-08-25 RX ADMIN — HYDRALAZINE HYDROCHLORIDE 20 MG: 20 INJECTION INTRAMUSCULAR; INTRAVENOUS at 20:02

## 2018-08-25 RX ADMIN — Medication 10 ML: at 22:56

## 2018-08-25 RX ADMIN — CHLORTHALIDONE 25 MG: 25 TABLET ORAL at 08:46

## 2018-08-25 RX ADMIN — Medication 10 ML: at 22:55

## 2018-08-25 RX ADMIN — POTASSIUM CHLORIDE 20 MEQ: 20 TABLET, EXTENDED RELEASE ORAL at 12:26

## 2018-08-25 RX ADMIN — CALCIUM CARBONATE 500 MG (1,250 MG)-VITAMIN D3 200 UNIT TABLET 1 TABLET: at 07:47

## 2018-08-25 RX ADMIN — CLONIDINE HYDROCHLORIDE 0.1 MG: 0.1 TABLET ORAL at 03:37

## 2018-08-25 RX ADMIN — MIRTAZAPINE 15 MG: 15 TABLET, FILM COATED ORAL at 22:53

## 2018-08-25 RX ADMIN — PRAVASTATIN SODIUM 40 MG: 20 TABLET ORAL at 22:53

## 2018-08-25 RX ADMIN — PANTOPRAZOLE SODIUM 40 MG: 40 TABLET, DELAYED RELEASE ORAL at 07:47

## 2018-08-25 RX ADMIN — ENOXAPARIN SODIUM 40 MG: 40 INJECTION, SOLUTION INTRAVENOUS; SUBCUTANEOUS at 22:53

## 2018-08-25 RX ADMIN — ASPIRIN 81 MG: 81 TABLET, COATED ORAL at 08:46

## 2018-08-25 RX ADMIN — ATENOLOL 25 MG: 25 TABLET ORAL at 08:44

## 2018-08-25 RX ADMIN — MONTELUKAST SODIUM 10 MG: 10 TABLET, FILM COATED ORAL at 08:45

## 2018-08-25 RX ADMIN — LORATADINE 10 MG: 10 TABLET ORAL at 08:45

## 2018-08-25 RX ADMIN — FAMOTIDINE 20 MG: 20 TABLET ORAL at 20:06

## 2018-08-25 RX ADMIN — FAMOTIDINE 20 MG: 20 TABLET ORAL at 08:45

## 2018-08-25 RX ADMIN — Medication 10 ML: at 14:16

## 2018-08-25 RX ADMIN — LORAZEPAM 1 MG: 1 TABLET ORAL at 08:45

## 2018-08-25 RX ADMIN — ATENOLOL 25 MG: 25 TABLET ORAL at 18:50

## 2018-08-25 NOTE — PROGRESS NOTES
Primary Nurse Nathanael Salter, HERMELINDA and Mani Romo, RN performed a dual skin assessment on this patient Impairment noted- see wound doc flow sheet  Iggy score is 22    Patient has a bruise to her left eye. No other skin break down noted.

## 2018-08-25 NOTE — PROGRESS NOTES
This RN has changed the battery and leads in heart monitor box. This RN has also replaced stickers on pt. Monitor room has been notified of these things.

## 2018-08-25 NOTE — PROGRESS NOTES
Tech went into pt's room to attach heart monitor and check blood sugar. Find patient standing in room with daughter, patient seems agitated (RN notified) and verbally refused to let me put on her heart monitor or check her blood sugar. Patient states that Chastity Noland will not do anything until she gets some answers. \" Patient also stated \"we are trying to trick her\" after explaining the doctor has been in to see the patient twice. Monitor room and primary RN notified.

## 2018-08-25 NOTE — PROGRESS NOTES
Hospitalist Progress Note    2018  Admit Date: 2018  5:40 PM   NAME: Bud Sellers   :  1940   MRN:  [id number]   Attending: Dulce Irvin MD  PCP:  Lorin Sy MD    SUBJECTIVE:   Luiz Mclean is a 78 yo F placed in observation on  for malignant hypertension and headache. Blood pressure much better than at time of admission and she reports headache better. States she has some discomfort in the 'liters of my neck' which has been ongoing since falling 2 weeks ago. She denies any further L eye vision problems/blindness since admission. MRI brain is pending for today. Seen with daughter, Jean Joe, at bedside. Review of Systems negative with exception of pertinent positives noted above  PHYSICAL EXAM     Visit Vitals    /74    Pulse 65    Temp 98.2 °F (36.8 °C)    Resp 17    Ht 5' 5\" (1.651 m)    Wt 52.2 kg (115 lb)    SpO2 96%    BMI 19.14 kg/m2      Temp (24hrs), Av.3 °F (36.8 °C), Min:98.1 °F (36.7 °C), Max:98.5 °F (36.9 °C)    Patient Vitals for the past 24 hrs:   Temp Pulse Resp BP SpO2   18 0733 98.2 °F (36.8 °C) 65 17 176/74 96 %   18 0330 98.5 °F (36.9 °C) 97 18 199/72 96 %   18 2300 98.5 °F (36.9 °C) 86 18 199/84 98 %   18 2039 98.1 °F (36.7 °C) 80 16 138/90 99 %   187 - 71 16 196/77 99 %   18 1940 - 76 - (!) 208/91 -   18 -  16 187/79 99 %   18 -  16 (!) 242/100 97 %   18 1621 98.4 °F (36.9 °C) 77 16 (!) 243/104 98 %       Oxygen Therapy  O2 Sat (%): 96 % (18 0733)  Pulse via Oximetry: 77 beats per minute (18 1621)  O2 Device: Room air (18 0745)    Intake/Output Summary (Last 24 hours) at 18 1114  Last data filed at 18 0711   Gross per 24 hour   Intake              312 ml   Output                0 ml   Net              312 ml      General: No acute distress, elderly, old bruise on L forehead    Lungs:  CTA Bilaterally.    Heart:  Regular rate and rhythm,  No murmur, rub, or gallop  Abdomen: Soft, Non distended, Non tender, Positive bowel sounds  Extremities: No cyanosis, clubbing or edema  Neurologic:  No focal deficits    Recent Results (from the past 24 hour(s))   METABOLIC PANEL, BASIC    Collection Time: 08/24/18  6:37 PM   Result Value Ref Range    Sodium 144 136 - 145 mmol/L    Potassium 3.5 3.5 - 5.1 mmol/L    Chloride 104 98 - 107 mmol/L    CO2 28 21 - 32 mmol/L    Anion gap 12 7 - 16 mmol/L    Glucose 136 (H) 65 - 100 mg/dL    BUN 17 8 - 23 MG/DL    Creatinine 0.74 0.6 - 1.0 MG/DL    GFR est AA >60 >60 ml/min/1.73m2    GFR est non-AA >60 >60 ml/min/1.73m2    Calcium 10.6 (H) 8.3 - 10.4 MG/DL   CBC WITH AUTOMATED DIFF    Collection Time: 08/24/18  6:37 PM   Result Value Ref Range    WBC 6.7 4.3 - 11.1 K/uL    RBC 4.41 4.05 - 5.2 M/uL    HGB 12.5 11.7 - 15.4 g/dL    HCT 38.1 35.8 - 46.3 %    MCV 86.4 79.6 - 97.8 FL    MCH 28.3 26.1 - 32.9 PG    MCHC 32.8 31.4 - 35.0 g/dL    RDW 12.8 %    PLATELET 317 269 - 653 K/uL    MPV 11.0 9.4 - 12.3 FL    ABSOLUTE NRBC 0.00 0.0 - 0.2 K/uL    DF AUTOMATED      NEUTROPHILS 59 43 - 78 %    LYMPHOCYTES 31 13 - 44 %    MONOCYTES 8 4.0 - 12.0 %    EOSINOPHILS 2 0.5 - 7.8 %    BASOPHILS 1 0.0 - 2.0 %    IMMATURE GRANULOCYTES 0 0.0 - 5.0 %    ABS. NEUTROPHILS 3.9 1.7 - 8.2 K/UL    ABS. LYMPHOCYTES 2.1 0.5 - 4.6 K/UL    ABS. MONOCYTES 0.5 0.1 - 1.3 K/UL    ABS. EOSINOPHILS 0.1 0.0 - 0.8 K/UL    ABS. BASOPHILS 0.1 0.0 - 0.2 K/UL    ABS. IMM. GRANS. 0.0 0.0 - 0.5 K/UL   HEPATIC FUNCTION PANEL    Collection Time: 08/24/18  6:37 PM   Result Value Ref Range    Protein, total 8.6 (H) 6.3 - 8.2 g/dL    Albumin 3.9 3.2 - 4.6 g/dL    Globulin 4.7 (H) 2.3 - 3.5 g/dL    A-G Ratio 0.8 (L) 1.2 - 3.5      Bilirubin, total 0.3 0.2 - 1.1 MG/DL    Bilirubin, direct <0.1 <0.4 MG/DL    Alk.  phosphatase 127 50 - 136 U/L    AST (SGOT) 15 15 - 37 U/L    ALT (SGPT) 21 12 - 65 U/L   LIPASE    Collection Time: 08/24/18  6:37 PM   Result Value Ref Range    Lipase 142 73 - 393 U/L   EKG, 12 LEAD, INITIAL    Collection Time: 08/24/18  6:43 PM   Result Value Ref Range    Ventricular Rate 69 BPM    Atrial Rate 69 BPM    P-R Interval 166 ms    QRS Duration 94 ms    Q-T Interval 374 ms    QTC Calculation (Bezet) 400 ms    Calculated P Axis 55 degrees    Calculated R Axis -2 degrees    Calculated T Axis 25 degrees    Diagnosis       Normal sinus rhythm  Possible Left atrial enlargement  Left ventricular hypertrophy  Nonspecific ST and T wave abnormality  Abnormal ECG  No previous ECGs available  Confirmed by JEANETTE ORELLANA (), [place] ([id number]) on 8/25/2018 8:51:14 AM     GLUCOSE, POC    Collection Time: 08/24/18  9:44 PM   Result Value Ref Range    Glucose (POC) 182 (H) 65 - 100 mg/dL   SED RATE, AUTOMATED    Collection Time: 08/25/18  4:17 AM   Result Value Ref Range    Sed rate, automated 22 0 - 30 mm/hr   CBC W/O DIFF    Collection Time: 08/25/18  4:17 AM   Result Value Ref Range    WBC 6.0 4.3 - 11.1 K/uL    RBC 3.78 (L) 4.05 - 5.2 M/uL    HGB 10.7 (L) 11.7 - 15.4 g/dL    HCT 32.5 (L) 35.8 - 46.3 %    MCV 86.0 79.6 - 97.8 FL    MCH 28.3 26.1 - 32.9 PG    MCHC 32.9 31.4 - 35.0 g/dL    RDW 12.7 %    PLATELET 629 988 - 929 K/uL    MPV 11.3 9.4 - 12.3 FL    ABSOLUTE NRBC 0.00 0.0 - 0.2 K/uL   LIPID PANEL    Collection Time: 08/25/18  4:17 AM   Result Value Ref Range    LIPID PROFILE          Cholesterol, total 112 <200 MG/DL    Triglyceride 138 35 - 150 MG/DL    HDL Cholesterol 43 40 - 60 MG/DL    LDL, calculated 41.4 <100 MG/DL    VLDL, calculated 27.6 (H) 6.0 - 23.0 MG/DL    CHOL/HDL Ratio 2.6     HEMOGLOBIN A1C WITH EAG    Collection Time: 08/25/18  4:17 AM   Result Value Ref Range    Hemoglobin A1c 6.3 (H) 4.8 - 6.0 %    Est. average glucose 134 mg/dL   TSH 3RD GENERATION    Collection Time: 08/25/18  4:17 AM   Result Value Ref Range    TSH 3.570 0.358 - 3.740 uIU/mL   T4, FREE    Collection Time: 08/25/18  4:17 AM   Result Value Ref Range    T4, Free 1.1 0.78 - 1.46 NG/DL   METABOLIC PANEL, BASIC    Collection Time: 08/25/18  4:17 AM   Result Value Ref Range    Sodium 142 136 - 145 mmol/L    Potassium 3.0 (L) 3.5 - 5.1 mmol/L    Chloride 106 98 - 107 mmol/L    CO2 28 21 - 32 mmol/L    Anion gap 8 7 - 16 mmol/L    Glucose 106 (H) 65 - 100 mg/dL    BUN 14 8 - 23 MG/DL    Creatinine 0.58 (L) 0.6 - 1.0 MG/DL    GFR est AA >60 >60 ml/min/1.73m2    GFR est non-AA >60 >60 ml/min/1.73m2    Calcium 9.6 8.3 - 10.4 MG/DL   MAGNESIUM    Collection Time: 08/25/18  4:17 AM   Result Value Ref Range    Magnesium 1.5 (L) 1.8 - 2.4 mg/dL   C REACTIVE PROTEIN, QT    Collection Time: 08/25/18  4:17 AM   Result Value Ref Range    C-Reactive protein <0.3 0.0 - 0.9 mg/dL   GLUCOSE, POC    Collection Time: 08/25/18  6:04 AM   Result Value Ref Range    Glucose (POC) 120 (H) 65 - 100 mg/dL     Imaging:    CT SPINE CERV WO CONT   Final Result   IMPRESSION: No fracture. Degenerative change. CT HEAD WO CONT   Final Result   Impression: No evidence of hemorrhage or acute intracranial abnormality. MRI BRAIN WO CONT    (Results Pending)         ASSESSMENT      Hospital Problems as of 8/25/2018  Date Reviewed: 5/21/2015          Codes Class Noted - Resolved POA    Hypokalemia ICD-10-CM: E87.6  ICD-9-CM: 276.8  8/25/2018 - Present No        Hypomagnesemia ICD-10-CM: E83.42  ICD-9-CM: 275.2  8/25/2018 - Present No        * (Principal)HTN (hypertension), malignant ICD-10-CM: I10  ICD-9-CM: 401.0  8/24/2018 - Present Yes        Amaurosis fugax ICD-10-CM: G45.3  ICD-9-CM: 362.34  8/24/2018 - Present Yes        Hyperlipidemia (Chronic) ICD-10-CM: E78.5  ICD-9-CM: 272.4  5/21/2015 - Present Yes        Anxiety disorder (Chronic) ICD-10-CM: F41.9  ICD-9-CM: 300.00  5/21/2015 - Present Yes        Diabetes ([facility] (Chronic) ICD-10-CM: E11.9  ICD-9-CM: 250.00  5/20/2015 - Present Yes            Plan:  · Prior to admission meds updated.   · Increase atenolol to BID, start HCTZ tomorrow, stop chlorthalidone. · Stop spironolactone as not taking at home. · PRN clonidine. · Suspect that she may not be taking meds correctly at home. Monitor blood pressure after resuming home meds. · Await MRI results. · Hypomagnesemia/hypokalemia- replete    DVT Prophylaxis: Lovenox    Signed By: Ivett Tadeo.  Maylin Salguero MD     August 25, 2018

## 2018-08-25 NOTE — PROGRESS NOTES
Called due to patient having confusion. Occurred after returning from MRI. Her daughter states that patient stated she 'turned off a tv in the closet' and saying other strange things like that. She is alert and oriented x 3 and seems to realize she is saying strange things. On discussion with her daughter, patient apparently only takes 0.5 mg of ativan at bedtime. This may be related to 1 mg ativan given earlier. I also suspect she sustained a concussion with the fall about 10 days ago. She was having headaches and her daughter states she seemed a little confused when she talked with her over the phone (her daughter just got back from the beach). Will stop IVF. Last systolic bp down to 392 per RN. Hold off of hydralazine for now and monitor bp. With recent head injury and confusion being exhibited, I suspect she was not taking her meds correctly. This confusion may also be due to unmasking some underlying dementia.     Rita Barlow MD

## 2018-08-25 NOTE — PROGRESS NOTES
Blood pressure (!) 222/87, pulse (!) 58, temperature 98.3 °F (36.8 °C), resp. rate 17, height 5' 5\" (1.651 m), weight 52.2 kg (115 lb), SpO2 95 %.     Dr Marek Solano paged regarding elevated BP, will place orders

## 2018-08-25 NOTE — PROGRESS NOTES
spoke with the nurse at 2116 and asked for the patient's consent form to be signed before the MRI examination - GSE

## 2018-08-25 NOTE — H&P
HOSPITALIST H&P/CONSULT  NAME:  Ken Arellano   Age:  77 y.o.  :   1940   MRN:   [id number]  PCP: Wilbur Brown MD  Treatment Team: Attending Provider: Jose Ugalde MD    Prior     CC: Reason for admission is: malig HTN    HPI:   Patient history was obtained from the ER provider prior to seeing the patient. Patient is a 77 y.o. female who presents to the ER due to several falls at home in the last few weeks. Over the last day or two, pt has been complaining of feeling confused. Family present states that she can answer questions ok; she just doesn't feel right. Having headaches, describes as pressure, mostly at back of head and neck. She also reports several episodes of intermittent vision loss in her left eye. Happens suddenly, lasts a few minutes, then goes away,  It is a total blindness when it occurs. Denies eye pain or injury. In ER, her BP has been very high. Unknown home BP.       ROS:  All systems have been reviewed and are negative except as stated in HPI or elsewhere.       Past Medical History:   Diagnosis Date    Amaurosis fugax 2018    Arthritis     Atrial fibrillation (HCC)     Diabetes (HCC)     GERD (gastroesophageal reflux disease)     HTN     Hyperlipidemia     Paroxysmal SVT (supraventricular tachycardia) (HCC) / Rapid atrial 10/12/2015    Seasonal allergic rhinitis       Past Surgical History:   Procedure Laterality Date    HX CORONARY STENT PLACEMENT      HX HEENT      HX OTHER SURGICAL      AVNRT ABLATION      Social History   Substance Use Topics    Smoking status: Never Smoker    Smokeless tobacco: Never Used    Alcohol use No      Family History   Problem Relation Age of Onset    Heart Attack Mother     Cancer Father      leukemia    Cancer Brother     Cancer Sister     Breast Cancer Sister        FH Reviewed and non-contributory to admitting diagnosis    Allergies   Allergen Reactions    Biaxin [Clarithromycin] Rash and Hives  Macrobid [Nitrofurantoin Monohyd/M-Cryst] Rash    Macrodantin [Nitrofurantoin Macrocrystalline] Rash    Norvasc [Amlodipine] Other (comments) and Rash     Headache, blurred vision    Ofloxacin Other (comments) and Rash     Blurred vision, headache      Prior to Admission Medications   Prescriptions Last Dose Informant Patient Reported? Taking? B.infantis-B.ani-B.long-B.bifi (PROBIOTIC 4X) 10-15 mg TbEC   Yes No   Sig: Take  by mouth. Cetirizine (ZYRTEC) 10 mg cap   Yes No   Sig: Take  by mouth. Cholecalciferol, Vitamin D3, (VITAMIN D3) 1,000 unit cap   Yes No   Sig: Take  by mouth two (2) times a day. FE FUMARATE/VIT E/FA/BCOMP&C (ALLBEE C-800 PLUS IRON PO)   Yes No   Sig: Take  by mouth. LORazepam (ATIVAN) 1 mg tablet   No No   Sig: Take 1 Tab by mouth two (2) times a day. Max Daily Amount: 2 mg. Patient taking differently: Take 1 mg by mouth nightly. SALMON OIL/OMEGA-3 FATTY ACIDS (SALMON OIL-1000 PO)   Yes No   Sig: Take  by mouth. ascorbic acid (VITAMIN C) 1,000 mg tablet   Yes No   Sig: Take  by mouth. aspirin delayed-release 81 mg tablet   Yes No   Sig: Take  by mouth daily. atenolol (TENORMIN) 25 mg tablet   No No   Sig: Take 1 Tab by mouth daily. b-complex with vitamin c tablet   Yes No   Sig: Take 1 Tab by mouth daily. benazepril (LOTENSIN) 20 mg tablet   Yes No   Sig: Take 40 mg by mouth daily. calcitonin, salmon, (MIACALCIN) nasal   Yes No   Sig: Take  by mouth daily. calcium-cholecalciferol, d3, (CALCIUM 600 + D) 600-125 mg-unit tab   Yes No   Sig: Take  by mouth. chlorthalidone (HYGROTEN) 25 mg tablet   Yes No   Sig: Take  by mouth daily. ciclopirox (PENLAC) 8 % solution   Yes No   Sig: Apply  to affected area nightly.    cinnamon bark (CINNAMON) 500 mg cap   Yes No   Sig: Take  by mouth.   cinnamon bark-chromium--100-150 mg-mcg-mg cap   Yes No   Sig: Take  by mouth.   coconut oil 1,000 mg cap   Yes No   Sig: Take  by mouth.   cranberry extract (AZO CRANBERRY) 450 mg tab   Yes No   Sig: Take  by mouth.   cyanocobalamin (VITAMIN B-12) 1,000 mcg sublingual tablet   Yes No   Sig: Take 1,000 mcg by mouth daily. cyanocobalamin 1,000 mcg tablet   Yes No   Sig: Take  by mouth. dextran 70-hypromellose (ARTIFICIAL TEARS) ophthalmic solution   Yes No   Sig: Administer  to both eyes as needed. diclofenac (VOLTAREN) 1 % gel   Yes No   Sig: Apply  to affected area four (4) times daily. folic acid 20 mg cap   Yes No   Sig: Take  by mouth. folic acid 732 mcg tablet   Yes No   Sig: Take 800 mcg by mouth daily. ibuprofen (MOTRIN) 200 mg tablet   Yes No   Sig: Take  by mouth.   loratadine (CLARITIN) 10 mg tablet   Yes No   Sig: Take 10 mg by mouth daily. lorazepam (ATIVAN) 1 mg tablet   Yes No   Sig: Take 1 mg by mouth two (2) times a day. metFORMIN (GLUCOPHAGE) 500 mg tablet   No No   Sig: Take 1 Tab by mouth daily (with breakfast). Patient taking differently: Take 500 mg by mouth two (2) times a day. metoclopramide HCl (REGLAN) 10 mg tablet   No No   Sig: One tab by mouth before meals three times daily as needed. mirtazapine (REMERON) 15 mg tablet   No No   Sig: Take 1 Tab by mouth nightly. montelukast (SINGULAIR) 10 mg tablet   Yes No   Sig: Take 10 mg by mouth daily. multivitamin (ONE A DAY) tablet   Yes No   Sig: Take 1 Tab by mouth daily. naproxen sodium (NAPROSYN) 220 mg tablet   Yes No   Sig: Take 220 mg by mouth two (2) times daily (with meals). Only as needed   neomycin-bacitracin-polymyxin (NEOSPORIN) 3.5mg-400 unit- 5,000 unit/gram ointment   Yes No   Sig: Apply  to affected area two (2) times a day. omega-3 fatty acids-vitamin e (FISH OIL) 1,000 mg cap   Yes No   Sig: Take 1 Cap by mouth. omeprazole (PRILOSEC) 20 mg capsule   No No   Sig: Take 2 Caps by mouth daily. Patient taking differently: Take 40 mg by mouth daily.  Takes once daily   phenazopyridine (PYRIDIUM) 95 mg tab   Yes No   Sig: Take  by mouth.   pravastatin (PRAVACHOL) 40 mg tablet   Yes No   Sig: Take 40 mg by mouth nightly. ranitidine hcl (ZANTAC) 150 mg capsule   Yes No   Sig: Take 150 mg by mouth two (2) times a day. senna (SENOKOT) 8.6 mg tablet   Yes No   Sig: Take 1 Tab by mouth daily. simvastatin (ZOCOR) 10 mg tablet   No No   Sig: Take 1 Tab by mouth nightly. simvastatin (ZOCOR) 40 mg tablet   Yes No   spironolactone (ALDACTONE) 25 mg tablet   No No   Sig: Take 1 Tab by mouth daily. Indications: HYPERTENSION      Facility-Administered Medications: None         Objective:   Patient Vitals for the past 24 hrs:   Temp Pulse Resp BP SpO2   18 98.1 °F (36.7 °C) 80 16 138/90 99 %   18 -  16 196/77 99 %   18 -  - (!) 208/91 -   18 -  16 187/79 99 %   18 -  16 (!) 242/100 97 %   18 162 98.4 °F (36.9 °C) 77 16 (!) 243/104 98 %     No intake or output data in the 24 hours ending 18   Temp (24hrs), Av.3 °F (36.8 °C), Min:98.1 °F (36.7 °C), Max:98.4 °F (36.9 °C)    Oxygen Therapy  O2 Sat (%): 99 % (18)  Pulse via Oximetry: 77 beats per minute (18)  O2 Device: Room air (18)   Body mass index is 19.14 kg/(m^2). Physical Exam:    General:    WD and WN, No apparent distress. Head:   Normocephalic, without obvious abnormality, atraumatic. Eyes:  PERRL; EOMI; sclera normal/non-icteric  ENT:  Hearing is normal.  Oropharynx is clear with tacky mucous membranes   Resp:    Clear to auscultation bilaterally. No Wheezing or Rhonchi. Resp are even and unlabored  Heart[de-identified]  Regular rate and rhythm,  no murmur,   No LE edema  Abdomen:   Soft, non-tender. Not distended. Bowel sounds normal.  hepato-splenomegaly -none  Musc/SK: Muscle strength is good and tone normal; No cyanosis. No clubbing  Skin:     Texture, turgor normal. No significant rashes or lesions.    Neurologic: CN II - XII are grossly intact - other than Eye exam as noted above  Psych: Alert and oriented x 4;  Judgement and insight are normal     Data Review:   Recent Results (from the past 24 hour(s))   METABOLIC PANEL, BASIC    Collection Time: 08/24/18  6:37 PM   Result Value Ref Range    Sodium 144 136 - 145 mmol/L    Potassium 3.5 3.5 - 5.1 mmol/L    Chloride 104 98 - 107 mmol/L    CO2 28 21 - 32 mmol/L    Anion gap 12 7 - 16 mmol/L    Glucose 136 (H) 65 - 100 mg/dL    BUN 17 8 - 23 MG/DL    Creatinine 0.74 0.6 - 1.0 MG/DL    GFR est AA >60 >60 ml/min/1.73m2    GFR est non-AA >60 >60 ml/min/1.73m2    Calcium 10.6 (H) 8.3 - 10.4 MG/DL   CBC WITH AUTOMATED DIFF    Collection Time: 08/24/18  6:37 PM   Result Value Ref Range    WBC 6.7 4.3 - 11.1 K/uL    RBC 4.41 4.05 - 5.2 M/uL    HGB 12.5 11.7 - 15.4 g/dL    HCT 38.1 35.8 - 46.3 %    MCV 86.4 79.6 - 97.8 FL    MCH 28.3 26.1 - 32.9 PG    MCHC 32.8 31.4 - 35.0 g/dL    RDW 12.8 %    PLATELET 140 642 - 107 K/uL    MPV 11.0 9.4 - 12.3 FL    ABSOLUTE NRBC 0.00 0.0 - 0.2 K/uL    DF AUTOMATED      NEUTROPHILS 59 43 - 78 %    LYMPHOCYTES 31 13 - 44 %    MONOCYTES 8 4.0 - 12.0 %    EOSINOPHILS 2 0.5 - 7.8 %    BASOPHILS 1 0.0 - 2.0 %    IMMATURE GRANULOCYTES 0 0.0 - 5.0 %    ABS. NEUTROPHILS 3.9 1.7 - 8.2 K/UL    ABS. LYMPHOCYTES 2.1 0.5 - 4.6 K/UL    ABS. MONOCYTES 0.5 0.1 - 1.3 K/UL    ABS. EOSINOPHILS 0.1 0.0 - 0.8 K/UL    ABS. BASOPHILS 0.1 0.0 - 0.2 K/UL    ABS. IMM. GRANS. 0.0 0.0 - 0.5 K/UL   HEPATIC FUNCTION PANEL    Collection Time: 08/24/18  6:37 PM   Result Value Ref Range    Protein, total 8.6 (H) 6.3 - 8.2 g/dL    Albumin 3.9 3.2 - 4.6 g/dL    Globulin 4.7 (H) 2.3 - 3.5 g/dL    A-G Ratio 0.8 (L) 1.2 - 3.5      Bilirubin, total 0.3 0.2 - 1.1 MG/DL    Bilirubin, direct <0.1 <0.4 MG/DL    Alk.  phosphatase 127 50 - 136 U/L    AST (SGOT) 15 15 - 37 U/L    ALT (SGPT) 21 12 - 65 U/L   LIPASE    Collection Time: 08/24/18  6:37 PM   Result Value Ref Range    Lipase 142 73 - 393 U/L     CXR Results  (Last 48 hours)    None        CT Results  (Last 48 hours) 08/24/18 1813  CT HEAD WO CONT Final result    Impression:  Impression: No evidence of hemorrhage or acute intracranial abnormality. Narrative:  Noncontrast head CT        Clinical Indication: Persisting head pain for 6 days after a fall injury,   patient taking aspirin anticoagulation, vomiting, neck pain. Technique: Noncontrast axial images were obtained through the brain. All CT   scans at this location are performed using dose modulation techniques as   appropriate including the following: Automated exposure control, adjustment of   the MA and/or kV according to patient's size, or use of iterative reconstruction   technique. Comparison: None available       Findings: There is no acute intracranial hemorrhage, hydrocephalus, intra-axial   mass, or mass-effect. There are mild scattered and confluent small areas of   low-attenuation involving supratentorial white matter which are nonspecific,   most often chronic small vessel ischemic change and not unusual for age. Small   superimposed acute or developing infarct cannot be excluded by CT. There is no   CT evidence of acute large artery territorial infarction or abnormal extra-axial   fluid collection. The mastoid air cells and paranasal sinuses are clear where imaged. No displaced skull fractures are present. 08/24/18 1813  CT SPINE CERV WO CONT Final result    Impression:  IMPRESSION: No fracture. Degenerative change. Narrative:  CT of the Cervical Spine        INDICATION:  Persisting moderate neck pain for 6 days after a fall injury       COMPARISON: None       TECHNIQUE: Automated exposure Control was used. Multiple axial images were   obtained through the cervical spine without intravenous contrast. Coronal and   sagittal reformatted images were also reviewed for further evaluation of osseous   structures. FINDINGS: There is no evidence of acute fracture or dislocation.  There is mild   to moderate multilevel chronic appearing degenerative disc disease and facet   arthropathy. There is minimal anterolisthesis of C4 on C5 measuring 3-4 mm,   age-indeterminate but most likely chronic given the associated changes. No   focal aggressive lytic or blastic osseous lesions are seen. There is no   prevertebral soft tissue swelling. Partially imaged lung apices are grossly clear although limited by breathing   motion. The thyroid is grossly unremarkable. Assessment and Plan: Active Hospital Problems    Diagnosis Date Noted    HTN (hypertension), malignant 08/24/2018    Amaurosis fugax 08/24/2018    Diabetes ([facility] [address] 05/20/2015     Principal Problem:    HTN (hypertension), malignant (8/24/2018)    Continue home meds and add prn hydralazine and clonidine. Active Problems:    Diabetes ([address] (5/20/2015)    Home meds and SSI      Amaurosis fugax (8/24/2018)    MRI, cva work up      · PLAN   ·   · Cont appropriate home meds (see MAR)  · Control symptoms (pain, n/v, fever, etc)  · Monitor appropriate labs   · DVT prophylaxis:  Lovenox  · Code status: Full  · Risk: high  · Anticipated DC needs:  · Estimated LOS: less than 2 midnights  · Plans discussed with patient and/or caregiver; questions answered.       Med records reviewed if applicable; findings:     Critical care time if applicable:      Signed By: Emmanuelle Alejandro MD     August 24, 2018

## 2018-08-25 NOTE — PROGRESS NOTES
END OF SHIFT NOTE:    INTAKE/OUTPUT     Voiding: YES  Catheter: NO  Drain:              Flatus: Patient does have flatus present. Stool:  0 occurrences. Characteristics:       Emesis: 0 occurrences. Characteristics:        VITAL SIGNS  Patient Vitals for the past 12 hrs:   Temp Pulse Resp BP SpO2   08/25/18 0330 98.5 °F (36.9 °C) 97 18 199/72 96 %   08/24/18 2300 98.5 °F (36.9 °C) 86 18 199/84 98 %   08/24/18 2039 98.1 °F (36.7 °C) 80 16 138/90 99 %   08/24/18 1947 - 71 16 196/77 99 %   08/24/18 1940 - 76 - (!) 208/91 -   08/24/18 1931 - 73 16 187/79 99 %   08/24/18 1923 - 73 16 (!) 242/100 97 %       Pain Assessment  Pain Intensity 1: 0 (08/25/18 0200)  Pain Location 1: Head, Neck     Patient Stated Pain Goal: 0    Ambulating  Yes    Shift report given to oncoming nurse at the bedside.     Sadie Mccoy RN

## 2018-08-26 LAB
GLUCOSE BLD STRIP.AUTO-MCNC: 120 MG/DL (ref 65–100)
GLUCOSE BLD STRIP.AUTO-MCNC: 122 MG/DL (ref 65–100)
GLUCOSE BLD STRIP.AUTO-MCNC: 126 MG/DL (ref 65–100)
GLUCOSE BLD STRIP.AUTO-MCNC: 131 MG/DL (ref 65–100)
T3FREE SERPL-MCNC: 2.7 PG/ML (ref 2–4.4)

## 2018-08-26 PROCEDURE — 97161 PT EVAL LOW COMPLEX 20 MIN: CPT

## 2018-08-26 PROCEDURE — 82962 GLUCOSE BLOOD TEST: CPT

## 2018-08-26 PROCEDURE — 74011250637 HC RX REV CODE- 250/637: Performed by: FAMILY MEDICINE

## 2018-08-26 PROCEDURE — 74011250636 HC RX REV CODE- 250/636: Performed by: FAMILY MEDICINE

## 2018-08-26 PROCEDURE — 86580 TB INTRADERMAL TEST: CPT | Performed by: INTERNAL MEDICINE

## 2018-08-26 PROCEDURE — 65660000000 HC RM CCU STEPDOWN

## 2018-08-26 PROCEDURE — G8979 MOBILITY GOAL STATUS: HCPCS

## 2018-08-26 PROCEDURE — 76937 US GUIDE VASCULAR ACCESS: CPT

## 2018-08-26 PROCEDURE — 99218 HC RM OBSERVATION: CPT

## 2018-08-26 PROCEDURE — 97116 GAIT TRAINING THERAPY: CPT

## 2018-08-26 PROCEDURE — 74011000302 HC RX REV CODE- 302: Performed by: INTERNAL MEDICINE

## 2018-08-26 PROCEDURE — G8978 MOBILITY CURRENT STATUS: HCPCS

## 2018-08-26 PROCEDURE — 74011250637 HC RX REV CODE- 250/637: Performed by: INTERNAL MEDICINE

## 2018-08-26 RX ORDER — HYDRALAZINE HYDROCHLORIDE 50 MG/1
50 TABLET, FILM COATED ORAL EVERY 8 HOURS
Status: DISCONTINUED | OUTPATIENT
Start: 2018-08-26 | End: 2018-08-27 | Stop reason: HOSPADM

## 2018-08-26 RX ADMIN — PRAVASTATIN SODIUM 40 MG: 20 TABLET ORAL at 22:37

## 2018-08-26 RX ADMIN — MONTELUKAST SODIUM 10 MG: 10 TABLET, FILM COATED ORAL at 08:33

## 2018-08-26 RX ADMIN — SENNOSIDES 8.6 MG: 8.6 TABLET, FILM COATED ORAL at 08:33

## 2018-08-26 RX ADMIN — BENAZEPRIL HYDROCHLORIDE 40 MG: 10 TABLET, FILM COATED ORAL at 08:36

## 2018-08-26 RX ADMIN — Medication 10 ML: at 22:00

## 2018-08-26 RX ADMIN — HYDRALAZINE HYDROCHLORIDE 50 MG: 50 TABLET, FILM COATED ORAL at 22:37

## 2018-08-26 RX ADMIN — LORATADINE 10 MG: 10 TABLET ORAL at 08:33

## 2018-08-26 RX ADMIN — ATENOLOL 25 MG: 25 TABLET ORAL at 17:26

## 2018-08-26 RX ADMIN — FAMOTIDINE 20 MG: 20 TABLET ORAL at 08:36

## 2018-08-26 RX ADMIN — HYDRALAZINE HYDROCHLORIDE 10 MG: 20 INJECTION INTRAMUSCULAR; INTRAVENOUS at 03:18

## 2018-08-26 RX ADMIN — ATENOLOL 25 MG: 25 TABLET ORAL at 08:35

## 2018-08-26 RX ADMIN — Medication 10 ML: at 06:29

## 2018-08-26 RX ADMIN — ENOXAPARIN SODIUM 40 MG: 40 INJECTION, SOLUTION INTRAVENOUS; SUBCUTANEOUS at 22:37

## 2018-08-26 RX ADMIN — FAMOTIDINE 20 MG: 20 TABLET ORAL at 22:37

## 2018-08-26 RX ADMIN — HYDRALAZINE HYDROCHLORIDE 10 MG: 20 INJECTION INTRAMUSCULAR; INTRAVENOUS at 23:42

## 2018-08-26 RX ADMIN — TUBERCULIN PURIFIED PROTEIN DERIVATIVE 5 UNITS: 5 INJECTION, SOLUTION INTRADERMAL at 10:48

## 2018-08-26 RX ADMIN — HYDRALAZINE HYDROCHLORIDE 25 MG: 25 TABLET, FILM COATED ORAL at 06:27

## 2018-08-26 RX ADMIN — CALCIUM CARBONATE 500 MG (1,250 MG)-VITAMIN D3 200 UNIT TABLET 1 TABLET: at 08:32

## 2018-08-26 RX ADMIN — HYDROCHLOROTHIAZIDE 25 MG: 25 TABLET ORAL at 08:35

## 2018-08-26 RX ADMIN — POTASSIUM CHLORIDE 20 MEQ: 20 TABLET, EXTENDED RELEASE ORAL at 08:36

## 2018-08-26 RX ADMIN — HYDRALAZINE HYDROCHLORIDE 50 MG: 50 TABLET, FILM COATED ORAL at 14:54

## 2018-08-26 RX ADMIN — PANTOPRAZOLE SODIUM 40 MG: 40 TABLET, DELAYED RELEASE ORAL at 06:28

## 2018-08-26 RX ADMIN — ASPIRIN 81 MG: 81 TABLET, COATED ORAL at 08:33

## 2018-08-26 NOTE — PROGRESS NOTES
CM met with patient and daughter Criss Sidhu) at bedside to discuss d/c plan. Patient alert and oriented x3. Patient states she lives in an apartment alone with four steps to enter into the home. Patient / daughter states that patient have been independent with her ADL's and drives on a daily base and do have DME's in the home but doesn't use or need them them at this time. Patient will be returning back [facility]) patient / daughter Criss Sidhu) interested in [facility]. Daughter Criss Sidhu) requested resource information for Seniors. CM provided daughter with \"Senior\" booklet and pamphlet on \"A Place for Mom\". Patient will be d/c home on tomorrow. CM will continue to follow. Care Management Interventions  PCP Verified by CM: Yes (Dr. Lacey Hernandez)  Mode of Transport at Discharge:  Other (see comment) (Daughter (Elisabet)  Transition of Care Consult (CM Consult): Discharge Planning, [address] [facility]ENT[facility])  [address]: Yes  Discharge Durable Medical Equipment: No  Physical Therapy Consult: Yes  Occupational Therapy Consult: No  Speech Therapy Consult: No  Current Support Network: Own Home, Lives Alone  Confirm Follow Up Transport: Family (Daughter (Nadeem))  Plan discussed with Pt/Family/Caregiver: Yes  Freedom of Choice Offered: Yes  [address] Provided?: No  Discharge Location  Discharge Placement: Home with home health

## 2018-08-26 NOTE — PROGRESS NOTES
[address]  Face to Face Encounter    Patients Name: Heber Aguilera    YOB: 1940    Ordering Physician:Dr. Alonzo Maya    Primary Diagnosis: HTN (hypertension), malignant  HTN (hypertension), malignant    Date of Face to Face:   8/26/2018                                  Face to Face Encounter findings are related to primary reason for home care:   yes. 1. I certify that the patient needs intermittent care as follows: skilled nursing care:  skilled observation/assessment, patient education  physical therapy: strengthening    2. I certify that this patient is homebound, that is: 1) patient requires the use of a walker device, special transportation, or assistance of another to leave the home; or 2) patient's condition makes leaving the home medically contraindicated; and 3) patient has a normal inability to leave the home and leaving the home requires considerable and taxing effort. Patient may leave the home for infrequent and short duration for medical reasons, and occasional absences for non-medical reasons. Homebound status is due to the following functional limitations: Patient with strength deficits limiting the performance of all ADL's without caregiver assistance or the use of an assistive device. 3. I certify that this patient is under my care and that I, or a nurse practitioner or [facility], or clinical nurse specialist, or certified nurse midwife, working with me, had a Face-to-Face Encounter that meets the physician Face-to-Face Encounter requirements.   The following are the clinical findings from the [address] [place] [address] encounter that support the need for skilled services and is a summary of the encounter: [facility] Records    See discharge summary      Mercy Herrera  8/26/2018      THE FOLLOWING TO BE COMPLETED BY THE COMMUNITY PHYSICIAN:    I concur with the findings described above from the [facility] encounter that this patient is homebound and in need of a skilled service.     Certifying Physician: _____________________________________      Printed Certifying Physician Name: _____________________________________    Date: _________________

## 2018-08-26 NOTE — PROGRESS NOTES
END OF SHIFT NOTE:    INTAKE/OUTPUT  08/25 0701 - 08/26 0700  In: 312 [I.V.:312]  Out: -   Voiding: YES  Catheter: NO  Drain:              Flatus: Patient does have flatus present. Stool:  0 occurrences. Characteristics:       Emesis: 0 occurrences. Characteristics:        VITAL SIGNS  Patient Vitals for the past 12 hrs:   Temp Pulse Resp BP SpO2   08/26/18 0318 98 °F (36.7 °C) 64 18 168/74 98 %   08/25/18 2330 97.7 °F (36.5 °C) 70 18 168/73 99 %   08/25/18 2152 - 65 17 197/80 98 %   08/25/18 1943 97.7 °F (36.5 °C) (!) 58 17 (!) 222/87 96 %       Pain Assessment  Pain Intensity 1: 0 (08/25/18 1923)  Pain Location 1: Head  Pain Intervention(s) 1: Emotional support  Patient Stated Pain Goal: 0    Ambulating  Yes    Shift report given to oncoming nurse at the bedside.     German Burnett RN

## 2018-08-26 NOTE — PROGRESS NOTES
Problem: Mobility Impaired (Adult and Pediatric)  Goal: *Acute Goals and Plan of Care (Insert Text)  LTG:  (1.)Ms. Xavier Leahy will ambulate with MODIFIED INDEPENDENCE for >ht=729 feet with the least restrictive device, appropriate gait pattern and good dynamic standing balance within 7 treatment day(s). (2.)Ms. Sheikh Patient will perform B LE therapeutic exercises x 20 reps with INDEPENDENCE within 7 days to increase strength for improved safety and independence in transfers and gait. (3.)Ms. Xavier Leahy will ascend/descend 2 steps with no handrail(s) and MODIFIED INDEPENDENT within 7 day(s) for safe entry/exit of home.  ________________________________________________________________________________________________      PHYSICAL THERAPY: Initial Assessment 8/26/2018  OBSERVATION: Hospital Day: 3  Payor: [facility] MEDICARE / Plan: [facility] MEDICARE HMO/PPO / Product Type: [employer] Medicare /      NAME/AGE/GENDER: Erasto Boogie is a 77 y.o. female   PRIMARY DIAGNOSIS: HTN (hypertension), malignant HTN (hypertension), malignant HTN (hypertension), malignant        ICD-10: Treatment Diagnosis:    · Generalized Muscle Weakness (M62.81)  · Difficulty in walking, Not elsewhere classified (R26.2)  · Repeated Falls (R29.6)  · History of falling (Z91.81)   Precaution/Allergies:  Biaxin [clarithromycin]; Macrobid [nitrofurantoin monohyd/m-cryst]; Macrodantin [nitrofurantoin macrocrystalline]; Norvasc [amlodipine]; and Ofloxacin      ASSESSMENT:     Ms. Xavier Leahy presents standing in room with family visiting. Pt admitted with malignant hypertension and recurrent falls. Pt admits to >3 falls in recent months. Pt lives alone in apartment with 2 steps to enter (railing at steps is in disrepair per pt report). Pt is independent with cooking, ADLs, cleaning and still drives. Per RN, pt was appropriate to work with if systolic BP was less than 180. BP on contact was 173/76.   Pt presents with generalized decreased strength, coordination and balance in standing. Pt was modified independent for activities in room and CGA for gait in hallways 250'. Pt ambulated with fluctuations in chad, very narrow base of support and slightly decreased balance. Considering pt's fall history and current gait deviations, would strongly recommend pt utilize a rolling walker. Pt and family state that pt has a rollator at home. Relayed to RN that pt should have a home safety evaluation from HHPT to assist with maximizing safety in the home and for equipment evaluation. Pt agreed to utilize device to reduce fall risk as she has used this device in the past. Skilled PT to con't to follow to maximize function and reduce fall risk by increasing dynamic standing balance and improving safety awareness. In addition to the evaluation, this therapist performed additional gait training and balance training with pt to improve safety awareness and standing balance. This section established at most recent assessment   PROBLEM LIST (Impairments causing functional limitations):  1. Decreased Strength  2. Decreased ADL/Functional Activities  3. Decreased Transfer Abilities  4. Decreased Ambulation Ability/Technique  5. Decreased Balance  6. Increased Pain  7. Decreased Activity Tolerance  8. Decreased [place] with Home Exercise Program  9. Decreased Cognition   INTERVENTIONS PLANNED: (Benefits and precautions of physical therapy have been discussed with the patient.)  1. Balance Exercise  2. Home Exercise Program (HEP)  3. Therapeutic Activites  4. Therapeutic Exercise/Strengthening  5. Group Therapy     TREATMENT PLAN: Frequency/Duration: 3 times a week for duration of hospital stay  Rehabilitation Potential For Stated Goals: Good     RECOMMENDED REHABILITATION/EQUIPMENT: (at time of discharge pending progress): Due to the probability of continued deficits (see above) this patient will likely need continued skilled physical therapy after discharge.   Equipment:  pt reports having a rollator walker at home   None at this time              HISTORY:   History of Present Injury/Illness (Reason for Referral):  Per chart: Patient is a 77 y.o. female who presents to the ER due to several falls at home in the last few weeks. Over the last day or two, pt has been complaining of feeling confused. Family present states that she can answer questions ok; she just doesn't feel right. Having headaches, describes as pressure, mostly at back of head and neck. She also reports several episodes of intermittent vision loss in her left eye. Happens suddenly, lasts a few minutes, then goes away,  It is a total blindness when it occurs. Denies eye pain or injury. In ER, her BP has been very high. Unknown home BP.      Past Medical History/Comorbidities:   Ms. Emery Jara  has a past medical history of Amaurosis fugax (8/24/2018); Arthritis; Atrial fibrillation ([address] Diabetes ([address] GERD (gastroesophageal reflux disease); HTN; Hyperlipidemia; Paroxysmal SVT (supraventricular tachycardia) (HCC) / Rapid atrial (10/12/2015); and Seasonal allergic rhinitis. She also has no past medical history of Asthma; Autoimmune disease ([address] CAD (coronary artery disease); Cancer ([address] Chronic kidney disease; DEMENTIA; Heart failure ([address] Infectious disease; Liver disease; Other ill-defined conditions(799.89); Psychiatric disorder; PUD (peptic ulcer disease); Seizures ([address] Sleep disorder; or Thromboembolus ([address]   Ms. Emery Jara  has a past surgical history that includes hx coronary stent placement; hx heent; and hx other surgical.  Social History/Living Environment:   Home Environment: Apartment  # Steps to Enter: 2  Rails to Enter: Yes  Hand Rails : Right  Wheelchair Ramp: No  One/Two Story Residence: One story  Living Alone: Yes  Support Systems: Family member(s)  Patient Expects to be Discharged to[de-identified] Apartment  Current DME Used/Available at Home: Walker, rollator  Tub or Shower Type: Tub/Shower combination  Prior Level of Function/Work/Activity:  Pt lives independently in 1 level apartment with 2 steps to enter. Drives. Several recent falls. Daughter assists pt as able. Personal Factors:          Sex:  female        Age:  77 y.o. Number of Personal Factors/Comorbidities that affect the Plan of Care: 1-2: MODERATE COMPLEXITY   EXAMINATION:   Most Recent Physical Functioning:   Gross Assessment:  AROM: Generally decreased, functional  Strength: Generally decreased, functional  Coordination: Generally decreased, functional  Sensation: Impaired (decreased light touch B feet)               Posture:  Posture (WDL): Exceptions to WDL  Posture Assessment: Rounded shoulders  Balance:  Sitting: Intact  Standing: Impaired  Standing - Static: Good  Standing - Dynamic :  (fair plus) Bed Mobility:  Rolling: Modified independent  Supine to Sit: Modified independent  Sit to Supine: Modified independent  Wheelchair Mobility:     Transfers:  Sit to Stand: Modified independent  Stand to Sit: Modified independent  Bed to Chair: Modified independent  Gait:     Base of Support: Narrowed  Speed/Jamee: Fluctuations  Gait Abnormalities: Path deviations  Distance (ft): 250 Feet (ft)  Ambulation - Level of Assistance: Contact guard assistance   Vitals:        Temp: 97.8 °F (36.6 °C)  Temp Source: Oral  Pulse (Heart Rate): 60  Heart Rate Source: Monitor  Level of Consciousness: Alert  BP: 173/76 (at start of PT treatment)  MAP (Calculated): 108  BP 1 Location: Left arm  BP 1 Method: Automatic  BP Patient Position: Sitting  Resp Rate: 16  O2 Sat (%): 97 %  O2 Device: Room air  MEWS Score: 1     BP following gait 189/65. RN notified. Pt placed in supine to rest.           Body Structures Involved:  1. Muscles Body Functions Affected:  1. Movement Related Activities and Participation Affected:  1. Mobility  2.  Self Care   Number of elements that affect the Plan of Care: 4+: HIGH COMPLEXITY   CLINICAL PRESENTATION:   Presentation: Stable and uncomplicated: LOW COMPLEXITY   CLINICAL DECISION MAKIN[address] [place] [id number] AM-PAC 6 Clicks   Basic Mobility Inpatient Short Form  How much difficulty does the patient currently have. .. Unable A Lot A Little None   1. Turning over in bed (including adjusting bedclothes, sheets and blankets)? [] 1   [] 2   [] 3   [x] 4   2. Sitting down on and standing up from a chair with arms ( e.g., wheelchair, bedside commode, etc.)   [] 1   [] 2   [] 3   [x] 4   3. Moving from lying on back to sitting on the side of the bed? [] 1   [] 2   [] 3   [x] 4   How much help from another person does the patient currently need. .. Total A Lot A Little None   4. Moving to and from a bed to a chair (including a wheelchair)? [] 1   [] 2   [] 3   [x] 4   5. Need to walk in hospital room? [] 1   [] 2   [x] 3   [] 4   6. Climbing 3-5 steps with a railing? [] 1   [] 2   [x] 3   [] 4   © , Trustees of [address] [place] [id number], under license to [employer]. All rights reserved      Score:  Initial: 18 Most Recent: X (Date: -- )    Interpretation of Tool:  Represents activities that are increasingly more difficult (i.e. Bed mobility, Transfers, Gait). Score 24 23 22-20 19-15 14-10 9-7 6     Modifier CH CI CJ CK CL CM CN      ? Mobility - Walking and Moving Around:     - CURRENT STATUS: CK - 40%-59% impaired, limited or restricted    - GOAL STATUS: CK - 40%-59% impaired, limited or restricted    - D/C STATUS:  ---------------To be determined---------------  Payor: [facility] MEDIC[facility] / Plan[place] [facility] [place]/PPO / Product Type: Managed Care Medicare /      Medical Necessity:     · Patient demonstrates good rehab potential due to higher previous functional level. Reason for Services/Other Comments:  · Patient continues to require present interventions due to patient's inability to function at baseline.    Use of outcome tool(s) and clinical judgement create a POC that gives a: Clear prediction of patient's progress: LOW COMPLEXITY            TREATMENT:   (In addition to Assessment/Re-Assessment sessions the following treatments were rendered)   Pre-treatment Symptoms/Complaints:  \" I get a little wobbly sometimes. \"  Pain: Initial:   Pain Intensity 1: 0  Post Session:  Unchanged, 0/10     Gait Training (  8 minutes):  Gait training to improve and/or restore physical functioning as related to mobility, strength and balance. Ambulated 250 Feet (ft) with Contact guard assistance using no assistive device   and moderate   verbal cues related to their base of support and balance to promote increased safety and decrease fall risk. Instruction in performance of ambulating with walker to  to improve gait pattern. Braces/Orthotics/Lines/Etc:   · O2 Device: Room air  Treatment/Session Assessment:    · Response to Treatment:  Tolerated without difficulty  · Interdisciplinary Collaboration:   o Physical Therapist  o Registered Nurse  · After treatment position/precautions:   o Supine in bed  o Bed/Chair-wheels locked  o Bed in low position  o Call light within reach  o RN notified  o Family at bedside   · Compliance with Program/Exercises: Will assess as treatment progresses. · Recommendations/Intent for next treatment session: \"Next visit will focus on advancements to more challenging activities and reduction in assistance provided\".   Total Treatment Duration:  PT Patient Time In/Time Out  Time In: 1253  Time Out: [address], PT

## 2018-08-27 ENCOUNTER — HOME HEALTH ADMISSION (OUTPATIENT)
Dept: HOME HEALTH SERVICES | Facility: HOME HEALTH | Age: 78
End: 2018-08-27
Payer: MEDICARE

## 2018-08-27 VITALS
WEIGHT: 115 LBS | BODY MASS INDEX: 19.16 KG/M2 | DIASTOLIC BLOOD PRESSURE: 50 MMHG | HEART RATE: 71 BPM | SYSTOLIC BLOOD PRESSURE: 119 MMHG | HEIGHT: 65 IN | OXYGEN SATURATION: 96 % | TEMPERATURE: 97.8 F | RESPIRATION RATE: 18 BRPM
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
## 2018-08-27 LAB
ANION GAP SERPL CALC-SCNC: 9 MMOL/L (ref 7–16)
BUN SERPL-MCNC: 22 MG/DL (ref 8–23)
CALCIUM SERPL-MCNC: 10.2 MG/DL (ref 8.3–10.4)
CHLORIDE SERPL-SCNC: 108 MMOL/L (ref 98–107)
CO2 SERPL-SCNC: 25 MMOL/L (ref 21–32)
CREAT SERPL-MCNC: 0.79 MG/DL (ref 0.6–1)
GLUCOSE BLD STRIP.AUTO-MCNC: 124 MG/DL (ref 65–100)
GLUCOSE SERPL-MCNC: 132 MG/DL (ref 65–100)
MAGNESIUM SERPL-MCNC: 1.9 MG/DL (ref 1.8–2.4)
POTASSIUM SERPL-SCNC: 3.5 MMOL/L (ref 3.5–5.1)
SODIUM SERPL-SCNC: 142 MMOL/L (ref 136–145)

## 2018-08-27 PROCEDURE — 83735 ASSAY OF MAGNESIUM: CPT

## 2018-08-27 PROCEDURE — 74011250637 HC RX REV CODE- 250/637: Performed by: FAMILY MEDICINE

## 2018-08-27 PROCEDURE — 74011250637 HC RX REV CODE- 250/637: Performed by: INTERNAL MEDICINE

## 2018-08-27 PROCEDURE — 80048 BASIC METABOLIC PNL TOTAL CA: CPT

## 2018-08-27 PROCEDURE — 36415 COLL VENOUS BLD VENIPUNCTURE: CPT

## 2018-08-27 PROCEDURE — 82962 GLUCOSE BLOOD TEST: CPT

## 2018-08-27 RX ORDER — BENAZEPRIL HYDROCHLORIDE 40 MG/1
40 TABLET ORAL DAILY
Qty: 30 TAB | Refills: 0 | Status: SHIPPED | OUTPATIENT
Start: 2018-08-27 | End: 2020-03-16

## 2018-08-27 RX ORDER — HYDROCHLOROTHIAZIDE 25 MG/1
25 TABLET ORAL DAILY
Qty: 30 TAB | Refills: 0 | Status: SHIPPED | OUTPATIENT
Start: 2018-08-27 | End: 2020-03-16

## 2018-08-27 RX ORDER — ATENOLOL 25 MG/1
25 TABLET ORAL 2 TIMES DAILY
Qty: 60 TAB | Refills: 0 | Status: SHIPPED | OUTPATIENT
Start: 2018-08-27 | End: 2020-03-16

## 2018-08-27 RX ORDER — HYDRALAZINE HYDROCHLORIDE 50 MG/1
50 TABLET, FILM COATED ORAL EVERY 8 HOURS
Qty: 90 TAB | Refills: 0 | Status: SHIPPED | OUTPATIENT
Start: 2018-08-27 | End: 2020-03-16

## 2018-08-27 RX ORDER — PRAVASTATIN SODIUM 40 MG/1
40 TABLET ORAL
Qty: 30 TAB | Refills: 0 | Status: SHIPPED | OUTPATIENT
Start: 2018-08-27 | End: 2021-05-10

## 2018-08-27 RX ADMIN — HYDRALAZINE HYDROCHLORIDE 50 MG: 50 TABLET, FILM COATED ORAL at 05:56

## 2018-08-27 RX ADMIN — BENAZEPRIL HYDROCHLORIDE 40 MG: 10 TABLET, FILM COATED ORAL at 08:01

## 2018-08-27 RX ADMIN — LORATADINE 10 MG: 10 TABLET ORAL at 08:01

## 2018-08-27 RX ADMIN — POTASSIUM CHLORIDE 20 MEQ: 20 TABLET, EXTENDED RELEASE ORAL at 08:01

## 2018-08-27 RX ADMIN — SENNOSIDES 8.6 MG: 8.6 TABLET, FILM COATED ORAL at 08:01

## 2018-08-27 RX ADMIN — ATENOLOL 25 MG: 25 TABLET ORAL at 08:01

## 2018-08-27 RX ADMIN — MONTELUKAST SODIUM 10 MG: 10 TABLET, FILM COATED ORAL at 08:01

## 2018-08-27 RX ADMIN — CALCIUM CARBONATE 500 MG (1,250 MG)-VITAMIN D3 200 UNIT TABLET 1 TABLET: at 08:01

## 2018-08-27 RX ADMIN — ASPIRIN 81 MG: 81 TABLET, COATED ORAL at 08:01

## 2018-08-27 RX ADMIN — FAMOTIDINE 20 MG: 20 TABLET ORAL at 08:01

## 2018-08-27 RX ADMIN — PANTOPRAZOLE SODIUM 40 MG: 40 TABLET, DELAYED RELEASE ORAL at 08:01

## 2018-08-27 RX ADMIN — HYDROCHLOROTHIAZIDE 25 MG: 25 TABLET ORAL at 08:01

## 2018-08-27 NOTE — PROGRESS NOTES
Gave discharge instructions to the pt and the pt's daughter. The pt had a few concerns and questions about medication. All questions answered pt and daughter voiced a clear understanding. Iv removed and the primary nurse was notified.

## 2018-08-27 NOTE — PROGRESS NOTES
Spoke to Ms. Xavier Leahy and her daughter Ms. Tiara Gonsales (her cell is [phone]) in room 205. [facility] 13. RN and PT (referral, face to face, and order) completed yesterday by CM. No other needs identified, discharge home today.

## 2018-08-27 NOTE — DISCHARGE INSTRUCTIONS
DISCHARGE SUMMARY from Nurse    PATIENT INSTRUCTIONS:    After general anesthesia or intravenous sedation, for 24 hours or while taking prescription Narcotics:  · Limit your activities  · Do not drive and operate hazardous machinery  · Do not make important personal or business decisions  · Do  not drink alcoholic beverages  · If you have not urinated within 8 hours after discharge, please contact your surgeon on call. Report the following to your surgeon:  · Excessive pain, swelling, redness or odor of or around the surgical area  · Temperature over 100.5  · Nausea and vomiting lasting longer than 4 hours or if unable to take medications  · Any signs of decreased circulation or nerve impairment to extremity: change in color, persistent  numbness, tingling, coldness or increase pain  · Any questions    What to do at Home:  Recommended activity: Activity as tolerated,     If you experience any of the following symptoms fever greater then 100.5, pain unrelieved by medication, increase in shortness of breath,please follow up with primary care doctor. *  Please give a list of your current medications to your Primary Care Provider. *  Please update this list whenever your medications are discontinued, doses are      changed, or new medications (including over-the-counter products) are added. *  Please carry medication information at all times in case of emergency situations. These are general instructions for a healthy lifestyle:    No smoking/ No tobacco products/ Avoid exposure to second hand smoke  Surgeon General's Warning:  Quitting smoking now greatly reduces serious risk to your health.     Obesity, smoking, and sedentary lifestyle greatly increases your risk for illness    A healthy diet, regular physical exercise & weight monitoring are important for maintaining a healthy lifestyle    You may be retaining fluid if you have a history of heart failure or if you experience any of the following symptoms:  Weight gain of 3 pounds or more overnight or 5 pounds in a week, increased swelling in our hands or feet or shortness of breath while lying flat in bed. Please call your doctor as soon as you notice any of these symptoms; do not wait until your next office visit. Recognize signs and symptoms of STROKE:    F-face looks uneven    A-arms unable to move or move unevenly    S-speech slurred or non-existent    T-time-call 911 as soon as signs and symptoms begin-DO NOT go       Back to bed or wait to see if you get better-TIME IS BRAIN. Warning Signs of HEART ATTACK     Call 911 if you have these symptoms:   Chest discomfort. Most heart attacks involve discomfort in the center of the chest that lasts more than a few minutes, or that goes away and comes back. It can feel like uncomfortable pressure, squeezing, fullness, or pain.  Discomfort in other areas of the upper body. Symptoms can include pain or discomfort in one or both arms, the back, neck, jaw, or stomach.  Shortness of breath with or without chest discomfort.  Other signs may include breaking out in a cold sweat, nausea, or lightheadedness. Don't wait more than five minutes to call 911 - MINUTES MATTER! Fast action can save your life. Calling 911 is almost always the fastest way to get lifesaving treatment. Emergency Medical Services staff can begin treatment when they arrive -- up to an hour sooner than if someone gets to the hospital by car. The discharge information has been reviewed with the patient. The patient verbalized understanding. Discharge medications reviewed with the patient and appropriate educational materials and side effects teaching were provided.   ___________________________________________________________________________________________________________________________________     High Blood Pressure: Care Instructions  Your Care Instructions    If your blood pressure is usually above 130/80, you have high blood pressure, or hypertension. That means the top number is 130 or higher or the bottom number is 80 or higher, or both. Despite what a lot of people think, high blood pressure usually doesn't cause headaches or make you feel dizzy or lightheaded. It usually has no symptoms. But it does increase your risk for heart attack, stroke, and kidney or eye damage. The higher your blood pressure, the more your risk increases. Your doctor will give you a goal for your blood pressure. Your goal will be based on your health and your age. Lifestyle changes, such as eating healthy and being active, are always important to help lower blood pressure. You might also take medicine to reach your blood pressure goal.  Follow-up care is a key part of your treatment and safety. Be sure to make and go to all appointments, and call your doctor if you are having problems. It's also a good idea to know your test results and keep a list of the medicines you take. How can you care for yourself at home? Medical treatment  · If you stop taking your medicine, your blood pressure will go back up. You may take one or more types of medicine to lower your blood pressure. Be safe with medicines. Take your medicine exactly as prescribed. Call your doctor if you think you are having a problem with your medicine. · Talk to your doctor before you start taking aspirin every day. Aspirin can help certain people lower their risk of a heart attack or stroke. But taking aspirin isn't right for everyone, because it can cause serious bleeding. · See your doctor regularly. You may need to see the doctor more often at first or until your blood pressure comes down. · If you are taking blood pressure medicine, talk to your doctor before you take decongestants or anti-inflammatory medicine, such as ibuprofen. Some of these medicines can raise blood pressure. · Learn how to check your blood pressure at home. Lifestyle changes  · Stay at a healthy weight.  This is especially important if you put on weight around the waist. Losing even 10 pounds can help you lower your blood pressure. · If your doctor recommends it, get more exercise. Walking is a good choice. Bit by bit, increase the amount you walk every day. Try for at least 30 minutes on most days of the week. You also may want to swim, bike, or do other activities. · Avoid or limit alcohol. Talk to your doctor about whether you can drink any alcohol. · Try to limit how much sodium you eat to less than 2,300 milligrams (mg) a day. Your doctor may ask you to try to eat less than 1,500 mg a day. · Eat plenty of fruits (such as bananas and oranges), vegetables, legumes, whole grains, and low-fat dairy products. · Lower the amount of saturated fat in your diet. Saturated fat is found in animal products such as milk, cheese, and meat. Limiting these foods may help you lose weight and also lower your risk for heart disease. · Do not smoke. Smoking increases your risk for heart attack and stroke. If you need help quitting, talk to your doctor about stop-smoking programs and medicines. These can increase your chances of quitting for good. When should you call for help? Call 911 anytime you think you may need emergency care. This may mean having symptoms that suggest that your blood pressure is causing a serious heart or blood vessel problem. Your blood pressure may be over 180/110.   For example, call 911 if:    · You have symptoms of a heart attack. These may include:  ¨ Chest pain or pressure, or a strange feeling in the chest.  ¨ Sweating. ¨ Shortness of breath. ¨ Nausea or vomiting. ¨ Pain, pressure, or a strange feeling in the back, neck, jaw, or upper belly or in one or both shoulders or arms. ¨ Lightheadedness or sudden weakness. ¨ A fast or irregular heartbeat.     · You have symptoms of a stroke.  These may include:  ¨ Sudden numbness, tingling, weakness, or loss of movement in your face, arm, or leg, especially on only one side of your body. ¨ Sudden vision changes. ¨ Sudden trouble speaking. ¨ Sudden confusion or trouble understanding simple statements. ¨ Sudden problems with walking or balance. ¨ A sudden, severe headache that is different from past headaches.     · You have severe back or belly pain.    Do not wait until your blood pressure comes down on its own. Get help right away.   Call your doctor now or seek immediate care if:    · Your blood pressure is much higher than normal (such as 180/110 or higher), but you don't have symptoms.     · You think high blood pressure is causing symptoms, such as:  ¨ Severe headache. ¨ Blurry vision.    Watch closely for changes in your health, and be sure to contact your doctor if:    · Your blood pressure measures 140/90 or higher at least 2 times. That means the top number is 140 or higher or the bottom number is 90 or higher, or both.     · You think you may be having side effects from your blood pressure medicine.     · Your blood pressure is usually normal, but it goes above normal at least 2 times. Where can you learn more? Go to http://sophie-luis daniel.info/. Enter A122 in the search box to learn more about \"High Blood Pressure: Care Instructions. \"  Current as of: December 6, 2017  Content Version: 11.7  © 2090-8608 [employer]. Care instructions adapted under license by [employer] (which disclaims liability or warranty for this information). If you have questions about a medical condition or this instruction, always ask your healthcare professional. [place] [address] any warranty or liability for your use of this information.

## 2018-08-27 NOTE — DISCHARGE SUMMARY
Hospitalist Discharge Summary     Patient ID:  Baljeet Hidalgo  [id number]  69 y.o.  1940  Admit date: 8/24/2018  5:40 PM  Discharge date and time: 8/27/2018  Attending: Mrav Jacobo MD  PCP:  Nikolai Lazo MD  Treatment Team: Attending Provider: Marv Jacobo MD; Care Manager: Nevaeh Waller RN    Principal Diagnosis HTN (hypertension), malignant   Hospital Problems as of 8/27/2018  Date Reviewed: 5/21/2015          Codes Class Noted - Resolved POA    Fall at home ICD-10-CM: 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 female who presents to the ER due to several falls at home in the last few weeks. Over the last day or two, pt has been complaining of feeling confused. Family present states that she can answer questions ok; she just doesn't feel right. Having headaches, describes as pressure, mostly at back of head and neck. She also reports several episodes of intermittent vision loss in her left eye. Happens suddenly, lasts a few minutes, then goes away,  It is a total blindness when it occurs. Denies eye pain or injury. In ER, her BP has been very high. Unknown home BP.'    Hospital Course:  1.  Malignant hypertension- she was restarted on her home meds when her daughter brought her meds in (these were reconciled as incorrect initially) and had hydralazine added. Her blood pressure improved over the course of the hospitalization and she was normotensive on day of discharge. There is concern about her taking her meds correctly at home, so home health will be arranged to help with med management. Encouraged her daughter, Colton Nicole, to get pill box to assist with her taking correctly. She had L visual blindness intermittently prior to hospitalization but none during the hospital stay. MRI showed no acute CVA. 2. Falls at home- PT worked with here and will get home PT. 3. Concussion/confusion- suspect she had concussion from recent fall due to confusion that she has exhibited at times and a headache. There was also concern about nightly lorazepam causing confusion. This was stopped and patient slept well without it and thinking seemed to improve. Significant Diagnostic Studies:       Labs: Results:       Chemistry Recent Labs      08/27/18 0444  08/25/18 0417  08/24/18   1837   GLU  132*  106*  136*   NA  142  142  144   K  3.5  3.0*  3.5   CL  108*  106  104   CO2  25  28  28   BUN  22  14  17   CREA  0.79  0.58*  0.74   CA  10.2  9.6  10.6*   AGAP  9  8  12   AP   --    --   127   TP   --    --   8.6*   ALB   --    --   3.9   GLOB   --    --   4.7*   AGRAT   --    --   0.8*      CBC w/Diff Recent Labs      08/25/18 0417  08/24/18   1837   WBC  6.0  6.7   RBC  3.78*  4.41   HGB  10.7*  12.5   HCT  32.5*  38.1   PLT  172  190   GRANS   --   59   LYMPH   --   31   EOS   --   2      Cardiac Enzymes No results for input(s): CPK, CKND1, GOKUL in the last 72 hours. No lab exists for component: CKRMB, TROIP   Coagulation No results for input(s): PTP, INR, APTT in the last 72 hours.     No lab exists for component: INREXT, INREXT    Lipid Panel Lab Results   Component Value Date/Time    Cholesterol, total 112 08/25/2018 04:17 AM    HDL Cholesterol 43 08/25/2018 04:17 AM    LDL, calculated 41.4 08/25/2018 04:17 AM    VLDL, calculated 27.6 (H) 08/25/2018 04:17 AM    Triglyceride 138 08/25/2018 04:17 AM    CHOL/HDL Ratio 2.6 08/25/2018 04:17 AM      BNP No results for input(s): BNPP in the last 72 hours. Liver Enzymes Recent Labs      08/24/18   1837   TP  8.6*   ALB  3.9   AP  127   SGOT  15      Thyroid Studies Lab Results   Component Value Date/Time    TSH 3.570 08/25/2018 04:17 AM          Imaging:    MRI BRAIN WO CONT   Final Result   Impression:    1. Relatively mild chronic small vessel ischemic change. 2. Punctate foci of hemosiderin staining within the left basal ganglia and   thalamus. 3. Remote left middle cerebellar peduncle lacunar infarction and probable   perivascular space within the inferior left basal ganglia. CT SPINE CERV WO CONT   Final Result   IMPRESSION: No fracture. Degenerative change. CT HEAD WO CONT   Final Result   Impression: No evidence of hemorrhage or acute intracranial abnormality. Discharge Exam:  Visit Vitals    /50 (BP 1 Location: Left leg, BP Patient Position: At rest;Lying right side)    Pulse 71    Temp 97.8 °F (36.6 °C)    Resp 18    Ht 5' 5\" (1.651 m)    Wt 52.2 kg (115 lb)    SpO2 96%    BMI 19.14 kg/m2     General appearance: alert, cooperative, no distress, appears stated age, old bruise L forehead  Lungs: clear to auscultation bilaterally  Heart: regular rate and rhythm, S1, S2 normal, no murmur, click, rub or gallop  Abdomen: soft, non-tender. Bowel sounds normal. No masses,  no organomegaly  Extremities: no cyanosis or edema  Neurologic: Grossly normal    Disposition: home  Discharge Condition: stable  Patient Instructions:   Current Discharge Medication List      START taking these medications    Details   hydrALAZINE (APRESOLINE) 50 mg tablet Take 1 Tab by mouth every eight (8) hours.  8 AM, 2 PM, 8 PM  Indications: hypertension  Qty: 90 Tab, Refills: 0         CONTINUE these medications which have CHANGED    Details   atenolol (TENORMIN) 25 mg tablet Take 1 Tab by mouth two (2) times a day. Indications: hypertension  Qty: 60 Tab, Refills: 0      benazepril (LOTENSIN) 40 mg tablet Take 1 Tab by mouth daily. Indications: hypertension  Qty: 30 Tab, Refills: 0      hydroCHLOROthiazide (HYDRODIURIL) 25 mg tablet Take 1 Tab by mouth daily. Indications: hypertension  Qty: 30 Tab, Refills: 0      pravastatin (PRAVACHOL) 40 mg tablet Take 1 Tab by mouth nightly. Qty: 30 Tab, Refills: 0         CONTINUE these medications which have NOT CHANGED    Details   garlic 1 mg cap Take 1 Cap by mouth daily. metFORMIN (GLUCOPHAGE) 1,000 mg tablet Take 1,000 mg by mouth two (2) times daily (with meals). Indications: type 2 diabetes mellitus      SALMON OIL/OMEGA-3 FATTY ACIDS (SALMON OIL-1000 PO) Take  by mouth. aspirin delayed-release 81 mg tablet Take  by mouth daily. omeprazole (PRILOSEC) 20 mg capsule Take 2 Caps by mouth daily. Qty: 60 Cap, Refills: 1      multivitamin (ONE A DAY) tablet Take 1 Tab by mouth daily. B.infantis-B.ani-B.long-B.bifi (PROBIOTIC 4X) 10-15 mg TbEC Take  by mouth. STOP taking these medications       LORazepam (ATIVAN) 1 mg tablet Comments:   Reason for Stopping:         senna (SENOKOT) 8.6 mg tablet Comments:   Reason for Stopping:         dextran 70-hypromellose (ARTIFICIAL TEARS) ophthalmic solution Comments:   Reason for Stopping:         calcium-cholecalciferol, d3, (CALCIUM 600 + D) 600-125 mg-unit tab Comments:   Reason for Stopping:         lorazepam (ATIVAN) 1 mg tablet Comments:   Reason for Stopping:         loratadine (CLARITIN) 10 mg tablet Comments:   Reason for Stopping:         montelukast (SINGULAIR) 10 mg tablet Comments:   Reason for Stopping:               Activity: PT/OT per Home Health  Diet: Diabetic Diet    As she does not have a current PCP as Dr. Skye Eagle left practice, prescription for one month supply of her meds will be provided.       Follow-up Follow-up Appointments   Procedures    FOLLOW UP VISIT Appointment in: 3 - 5 Days 1. With PCP- patient to establish. 1. With PCP- patient to establish. Standing Status:   Standing     Number of Occurrences:   1     Order Specific Question:   Appointment in     Answer:   3 - 5 Days   ·   ·   Time spent to discharge patient 31 minutes  Signed:  Cynthia Hylton.  Trinidad Don MD  8/27/2018  8:01 AM

## 2018-08-28 ENCOUNTER — PATIENT OUTREACH (OUTPATIENT)
Dept: CASE MANAGEMENT | Age: 78
End: 2018-08-28

## 2018-08-28 NOTE — PROGRESS NOTES
Care Coordinator left voicemail message on home/mobile # [phone], requesting a return call from patient. Care Coordinator will make several more attempts to reach patient for Transitions of Care Outreach. This note will not be viewable in [address]

## 2018-08-28 NOTE — PROGRESS NOTES
Care Coordinator left voicemail message on home # [phone], requesting a return call from patient. Care Coordinator will make final attempt to reach patient for Transitions of Care Outreach in 5 business days. This note will not be viewable in [address]

## 2018-08-29 ENCOUNTER — HOME CARE VISIT (OUTPATIENT)
Dept: SCHEDULING | Facility: HOME HEALTH | Age: 78
End: 2018-08-29
Payer: MEDICARE

## 2018-08-29 VITALS
OXYGEN SATURATION: 98 % | HEART RATE: 78 BPM | RESPIRATION RATE: 15 BRPM | DIASTOLIC BLOOD PRESSURE: 70 MMHG | SYSTOLIC BLOOD PRESSURE: 159 MMHG | TEMPERATURE: 97 F

## 2018-08-29 PROCEDURE — 400013 HH SOC

## 2018-08-29 PROCEDURE — G0299 HHS/HOSPICE OF RN EA 15 MIN: HCPCS

## 2018-08-29 PROCEDURE — 3331090002 HH PPS REVENUE DEBIT

## 2018-08-29 PROCEDURE — 3331090001 HH PPS REVENUE CREDIT

## 2018-08-30 PROCEDURE — 3331090001 HH PPS REVENUE CREDIT

## 2018-08-30 PROCEDURE — 3331090002 HH PPS REVENUE DEBIT

## 2018-08-31 ENCOUNTER — HOME CARE VISIT (OUTPATIENT)
Dept: SCHEDULING | Facility: HOME HEALTH | Age: 78
End: 2018-08-31
Payer: MEDICARE

## 2018-08-31 VITALS
SYSTOLIC BLOOD PRESSURE: 170 MMHG | RESPIRATION RATE: 20 BRPM | TEMPERATURE: 98.6 F | DIASTOLIC BLOOD PRESSURE: 75 MMHG | HEART RATE: 76 BPM

## 2018-08-31 PROCEDURE — 3331090001 HH PPS REVENUE CREDIT

## 2018-08-31 PROCEDURE — 3331090002 HH PPS REVENUE DEBIT

## 2018-08-31 PROCEDURE — G0151 HHCP-SERV OF PT,EA 15 MIN: HCPCS

## 2018-09-01 PROCEDURE — 3331090002 HH PPS REVENUE DEBIT

## 2018-09-01 PROCEDURE — 3331090001 HH PPS REVENUE CREDIT

## 2018-09-02 PROCEDURE — 3331090001 HH PPS REVENUE CREDIT

## 2018-09-02 PROCEDURE — 3331090002 HH PPS REVENUE DEBIT

## 2018-09-03 PROCEDURE — 3331090002 HH PPS REVENUE DEBIT

## 2018-09-03 PROCEDURE — 3331090001 HH PPS REVENUE CREDIT

## 2018-09-04 PROCEDURE — 3331090002 HH PPS REVENUE DEBIT

## 2018-09-04 PROCEDURE — 3331090001 HH PPS REVENUE CREDIT

## 2018-09-05 PROCEDURE — 3331090002 HH PPS REVENUE DEBIT

## 2018-09-05 PROCEDURE — 3331090001 HH PPS REVENUE CREDIT

## 2018-09-06 ENCOUNTER — HOME CARE VISIT (OUTPATIENT)
Dept: SCHEDULING | Facility: HOME HEALTH | Age: 78
End: 2018-09-06
Payer: MEDICARE

## 2018-09-06 VITALS
OXYGEN SATURATION: 97 % | SYSTOLIC BLOOD PRESSURE: 170 MMHG | DIASTOLIC BLOOD PRESSURE: 72 MMHG | RESPIRATION RATE: 16 BRPM | HEART RATE: 70 BPM | TEMPERATURE: 98.4 F

## 2018-09-06 PROCEDURE — 3331090002 HH PPS REVENUE DEBIT

## 2018-09-06 PROCEDURE — G0299 HHS/HOSPICE OF RN EA 15 MIN: HCPCS

## 2018-09-06 PROCEDURE — G0155 HHCP-SVS OF CSW,EA 15 MIN: HCPCS

## 2018-09-06 PROCEDURE — 3331090001 HH PPS REVENUE CREDIT

## 2018-09-07 PROCEDURE — 3331090001 HH PPS REVENUE CREDIT

## 2018-09-07 PROCEDURE — 3331090002 HH PPS REVENUE DEBIT

## 2018-09-08 PROCEDURE — 3331090001 HH PPS REVENUE CREDIT

## 2018-09-08 PROCEDURE — 3331090002 HH PPS REVENUE DEBIT

## 2018-09-09 PROCEDURE — 3331090002 HH PPS REVENUE DEBIT

## 2018-09-09 PROCEDURE — 3331090001 HH PPS REVENUE CREDIT

## 2018-09-10 PROCEDURE — 3331090001 HH PPS REVENUE CREDIT

## 2018-09-10 PROCEDURE — 3331090002 HH PPS REVENUE DEBIT

## 2018-09-11 ENCOUNTER — HOME CARE VISIT (OUTPATIENT)
Dept: SCHEDULING | Facility: HOME HEALTH | Age: 78
End: 2018-09-11
Payer: MEDICARE

## 2018-09-11 PROCEDURE — 3331090002 HH PPS REVENUE DEBIT

## 2018-09-11 PROCEDURE — 3331090001 HH PPS REVENUE CREDIT

## 2018-09-12 PROCEDURE — 3331090001 HH PPS REVENUE CREDIT

## 2018-09-12 PROCEDURE — 3331090002 HH PPS REVENUE DEBIT

## 2018-09-13 PROCEDURE — 3331090001 HH PPS REVENUE CREDIT

## 2018-09-13 PROCEDURE — 3331090002 HH PPS REVENUE DEBIT

## 2018-09-14 PROCEDURE — 3331090001 HH PPS REVENUE CREDIT

## 2018-09-14 PROCEDURE — 3331090002 HH PPS REVENUE DEBIT

## 2018-09-15 PROCEDURE — 3331090002 HH PPS REVENUE DEBIT

## 2018-09-15 PROCEDURE — 3331090001 HH PPS REVENUE CREDIT

## 2018-09-16 PROCEDURE — 3331090001 HH PPS REVENUE CREDIT

## 2018-09-16 PROCEDURE — 3331090002 HH PPS REVENUE DEBIT

## 2018-09-17 PROCEDURE — 3331090001 HH PPS REVENUE CREDIT

## 2018-09-17 PROCEDURE — 3331090002 HH PPS REVENUE DEBIT

## 2018-09-18 PROCEDURE — 3331090001 HH PPS REVENUE CREDIT

## 2018-09-18 PROCEDURE — 3331090002 HH PPS REVENUE DEBIT

## 2018-09-19 PROCEDURE — 3331090002 HH PPS REVENUE DEBIT

## 2018-09-19 PROCEDURE — 3331090001 HH PPS REVENUE CREDIT

## 2018-09-20 PROCEDURE — 3331090002 HH PPS REVENUE DEBIT

## 2018-09-20 PROCEDURE — 3331090001 HH PPS REVENUE CREDIT

## 2018-09-21 PROCEDURE — 3331090001 HH PPS REVENUE CREDIT

## 2018-09-21 PROCEDURE — 3331090002 HH PPS REVENUE DEBIT

## 2018-09-22 PROCEDURE — 3331090001 HH PPS REVENUE CREDIT

## 2018-09-22 PROCEDURE — 3331090002 HH PPS REVENUE DEBIT

## 2018-09-23 PROCEDURE — 3331090001 HH PPS REVENUE CREDIT

## 2018-09-23 PROCEDURE — 3331090002 HH PPS REVENUE DEBIT

## 2018-09-24 ENCOUNTER — HOME CARE VISIT (OUTPATIENT)
Dept: SCHEDULING | Facility: HOME HEALTH | Age: 78
End: 2018-09-24
Payer: MEDICARE

## 2018-09-24 VITALS
RESPIRATION RATE: 18 BRPM | OXYGEN SATURATION: 99 % | HEART RATE: 66 BPM | DIASTOLIC BLOOD PRESSURE: 78 MMHG | SYSTOLIC BLOOD PRESSURE: 156 MMHG | TEMPERATURE: 97.9 F

## 2018-09-24 PROCEDURE — G0299 HHS/HOSPICE OF RN EA 15 MIN: HCPCS

## 2018-09-24 PROCEDURE — 3331090001 HH PPS REVENUE CREDIT

## 2018-09-24 PROCEDURE — 3331090002 HH PPS REVENUE DEBIT

## 2018-09-25 PROCEDURE — 3331090001 HH PPS REVENUE CREDIT

## 2018-09-25 PROCEDURE — 3331090002 HH PPS REVENUE DEBIT

## 2018-09-26 PROCEDURE — 3331090001 HH PPS REVENUE CREDIT

## 2018-09-26 PROCEDURE — 3331090002 HH PPS REVENUE DEBIT

## 2018-09-27 PROCEDURE — 3331090002 HH PPS REVENUE DEBIT

## 2018-09-27 PROCEDURE — 3331090001 HH PPS REVENUE CREDIT

## 2018-09-28 PROCEDURE — 3331090001 HH PPS REVENUE CREDIT

## 2018-09-28 PROCEDURE — 3331090002 HH PPS REVENUE DEBIT

## 2018-09-29 PROCEDURE — 3331090001 HH PPS REVENUE CREDIT

## 2018-09-29 PROCEDURE — 3331090002 HH PPS REVENUE DEBIT

## 2018-09-30 PROCEDURE — 3331090001 HH PPS REVENUE CREDIT

## 2018-09-30 PROCEDURE — 3331090002 HH PPS REVENUE DEBIT

## 2018-10-01 PROCEDURE — 3331090002 HH PPS REVENUE DEBIT

## 2018-10-01 PROCEDURE — 3331090001 HH PPS REVENUE CREDIT

## 2018-10-02 PROCEDURE — 3331090001 HH PPS REVENUE CREDIT

## 2018-10-02 PROCEDURE — 3331090002 HH PPS REVENUE DEBIT

## 2018-10-03 PROCEDURE — 3331090002 HH PPS REVENUE DEBIT

## 2018-10-03 PROCEDURE — 3331090001 HH PPS REVENUE CREDIT

## 2018-10-04 PROCEDURE — 3331090002 HH PPS REVENUE DEBIT

## 2018-10-04 PROCEDURE — 3331090001 HH PPS REVENUE CREDIT

## 2018-10-05 ENCOUNTER — HOME CARE VISIT (OUTPATIENT)
Dept: HOME HEALTH SERVICES | Facility: HOME HEALTH | Age: 78
End: 2018-10-05
Payer: MEDICARE

## 2018-10-05 PROCEDURE — 3331090002 HH PPS REVENUE DEBIT

## 2018-10-05 PROCEDURE — 3331090001 HH PPS REVENUE CREDIT

## 2018-10-06 PROCEDURE — 3331090002 HH PPS REVENUE DEBIT

## 2018-10-06 PROCEDURE — 3331090001 HH PPS REVENUE CREDIT

## 2018-10-07 PROCEDURE — 3331090001 HH PPS REVENUE CREDIT

## 2018-10-07 PROCEDURE — 3331090002 HH PPS REVENUE DEBIT

## 2018-10-08 PROCEDURE — 3331090002 HH PPS REVENUE DEBIT

## 2018-10-08 PROCEDURE — 3331090001 HH PPS REVENUE CREDIT

## 2018-10-09 PROCEDURE — 3331090002 HH PPS REVENUE DEBIT

## 2018-10-09 PROCEDURE — 3331090001 HH PPS REVENUE CREDIT

## 2018-10-10 PROCEDURE — 3331090001 HH PPS REVENUE CREDIT

## 2018-10-10 PROCEDURE — 3331090002 HH PPS REVENUE DEBIT

## 2018-10-11 PROCEDURE — 3331090001 HH PPS REVENUE CREDIT

## 2018-10-11 PROCEDURE — 3331090002 HH PPS REVENUE DEBIT

## 2018-10-12 PROCEDURE — 3331090002 HH PPS REVENUE DEBIT

## 2018-10-12 PROCEDURE — 3331090001 HH PPS REVENUE CREDIT

## 2018-10-13 PROCEDURE — 3331090002 HH PPS REVENUE DEBIT

## 2018-10-13 PROCEDURE — 3331090001 HH PPS REVENUE CREDIT

## 2018-10-14 PROCEDURE — 3331090001 HH PPS REVENUE CREDIT

## 2018-10-14 PROCEDURE — 3331090002 HH PPS REVENUE DEBIT

## 2018-10-15 PROCEDURE — 3331090001 HH PPS REVENUE CREDIT

## 2018-10-15 PROCEDURE — 3331090002 HH PPS REVENUE DEBIT

## 2018-10-16 PROCEDURE — 3331090001 HH PPS REVENUE CREDIT

## 2018-10-16 PROCEDURE — 3331090002 HH PPS REVENUE DEBIT

## 2018-10-17 PROCEDURE — 3331090002 HH PPS REVENUE DEBIT

## 2018-10-17 PROCEDURE — 3331090001 HH PPS REVENUE CREDIT

## 2018-10-18 PROCEDURE — 3331090002 HH PPS REVENUE DEBIT

## 2018-10-18 PROCEDURE — 3331090001 HH PPS REVENUE CREDIT

## 2018-10-19 PROCEDURE — 3331090002 HH PPS REVENUE DEBIT

## 2018-10-19 PROCEDURE — 3331090001 HH PPS REVENUE CREDIT

## 2018-10-20 PROCEDURE — 3331090002 HH PPS REVENUE DEBIT

## 2018-10-20 PROCEDURE — 3331090001 HH PPS REVENUE CREDIT

## 2018-10-21 PROCEDURE — 3331090002 HH PPS REVENUE DEBIT

## 2018-10-21 PROCEDURE — 3331090001 HH PPS REVENUE CREDIT

## 2018-10-22 PROCEDURE — 3331090002 HH PPS REVENUE DEBIT

## 2018-10-22 PROCEDURE — 3331090001 HH PPS REVENUE CREDIT

## 2018-10-23 PROCEDURE — 3331090002 HH PPS REVENUE DEBIT

## 2018-10-23 PROCEDURE — 3331090001 HH PPS REVENUE CREDIT

## 2018-10-24 PROCEDURE — 3331090001 HH PPS REVENUE CREDIT

## 2018-10-24 PROCEDURE — 3331090002 HH PPS REVENUE DEBIT

## 2018-10-25 PROCEDURE — 3331090001 HH PPS REVENUE CREDIT

## 2018-10-25 PROCEDURE — 3331090002 HH PPS REVENUE DEBIT

## 2018-10-26 PROCEDURE — 3331090002 HH PPS REVENUE DEBIT

## 2018-10-26 PROCEDURE — 3331090001 HH PPS REVENUE CREDIT

## 2018-10-27 PROCEDURE — 3331090001 HH PPS REVENUE CREDIT

## 2018-10-27 PROCEDURE — 3331090002 HH PPS REVENUE DEBIT

## 2020-07-29 NOTE — PROGRESS NOTES
ASO to right foot.  Tolerated well MSP'S intact     Giacomo Clonts, LPN  50/51/27 7976 Hospitalist Progress Note    2018  Admit Date: 2018  5:40 PM   NAME: Christina Hernandes   :  1940   MRN:  [id number]   Attending: Alejo Godinez MD  PCP:  Ifeoma No MD    SUBJECTIVE:   Debra Broderick is a 78 yo F placed in observation on  for malignant hypertension and headache. Blood pressure much better than at time of admission and she reports headache better. States she has some discomfort in the 'liters of my neck' which has been ongoing since falling 2 weeks ago. She denies any further L eye vision problems/blindness since admission. MRI brain is pending for today. Seen with daughter, Nadeem, at bedside. - seen with daughter at bedside. MRI brain without acute CVA. She denies any further vision issues. She had bp spike of 220s yesterday evening. Started on scheduled hydralazine and given 1 prn dose of IV hydralazine. She reports she slept well last night and declined the ativan. Her daughter states she is doing much better today than yesterday and her confusion has resolved.       Review of Systems negative with exception of pertinent positives noted above  PHYSICAL EXAM     Visit Vitals    /64    Pulse 71    Temp 98.4 °F (36.9 °C)    Resp 16    Ht 5' 5\" (1.651 m)    Wt 52.2 kg (115 lb)    SpO2 98%    BMI 19.14 kg/m2      Temp (24hrs), Av °F (36.7 °C), Min:97.7 °F (36.5 °C), Max:98.4 °F (36.9 °C)    Patient Vitals for the past 24 hrs:   Temp Pulse Resp BP SpO2   18 0710 98.4 °F (36.9 °C) 71 16 156/64 98 %   18 0318 98 °F (36.7 °C) 64 18 168/74 98 %   18 2330 97.7 °F (36.5 °C) 70 18 168/73 99 %   18 2152 - 65 17 197/80 98 %   18 1943 97.7 °F (36.5 °C) (!) 58 17 (!) 222/87 96 %   18 1530 98.3 °F (36.8 °C) 79 17 158/78 95 %   18 1050 98 °F (36.7 °C) 68 18 193/71 96 %       Oxygen Therapy  O2 Sat (%): 98 % (18 0710)  Pulse via Oximetry: 77 beats per minute (18 1621)  O2 Device: Room air (18 1530)  No intake or output data in the 24 hours ending 08/26/18 0912   General: No acute distress, elderly, old bruise on L forehead    Lungs:  CTA Bilaterally. Heart:  Regular rate and rhythm,  No murmur, rub, or gallop  Abdomen: Soft, Non distended, Non tender, Positive bowel sounds  Extremities: No cyanosis, clubbing or edema  Neurologic:  No focal deficits    Recent Results (from the past 24 hour(s))   GLUCOSE, POC    Collection Time: 08/25/18 10:58 AM   Result Value Ref Range    Glucose (POC) 145 (H) 65 - 100 mg/dL   GLUCOSE, POC    Collection Time: 08/25/18  2:47 PM   Result Value Ref Range    Glucose (POC) 120 (H) 65 - 100 mg/dL   GLUCOSE, POC    Collection Time: 08/25/18  9:02 PM   Result Value Ref Range    Glucose (POC) 127 (H) 65 - 100 mg/dL   GLUCOSE, POC    Collection Time: 08/26/18  5:46 AM   Result Value Ref Range    Glucose (POC) 120 (H) 65 - 100 mg/dL     Imaging:    CT SPINE CERV WO CONT   Final Result   IMPRESSION: No fracture. Degenerative change. CT HEAD WO CONT   Final Result   Impression: No evidence of hemorrhage or acute intracranial abnormality.             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 · HTN- hydralazine added last evening. Increase to 50 mg TID. · Continue other bp meds. · Monitor blood pressure today. · Consult case management for information regarding assistance at home. · Consult PT due to falls at home and place PPD in case. · Hypomagnesemia/hypokalemia- recheck electrolytes tomorrow    Dispo- if bp stable, will discharge home tomorrow with [facility] for medication management    DVT Prophylaxis: Lovenox    Signed By: Fatmata Grimm.  Nghia Ryan MD     August 26, 2018 General Sunscreen Counseling: I recommended a broad spectrum sunscreen with a SPF of 30 or higher.  Sunscreens should be applied at least 15 minutes prior to expected sun exposure and then every 2 hours after that as long as sun exposure continues. If swimming or exercising, sunscreen reapplication should be considered every hour after getting wet or sweating. Sun protective clothing can be used in lieu of sunscreen but must be worn the entire time you are exposed to the sun's rays. Detail Level: Generalized